# Patient Record
Sex: MALE | Race: BLACK OR AFRICAN AMERICAN | Employment: OTHER | URBAN - METROPOLITAN AREA
[De-identification: names, ages, dates, MRNs, and addresses within clinical notes are randomized per-mention and may not be internally consistent; named-entity substitution may affect disease eponyms.]

---

## 2017-01-06 ENCOUNTER — PATIENT OUTREACH (OUTPATIENT)
Dept: FAMILY MEDICINE CLINIC | Age: 52
End: 2017-01-06

## 2017-01-06 NOTE — PROGRESS NOTES
Transitions of Care Coordination    Reached patient and identified self/role. Mr. Blanc Cons stated \"I'm doing fine. I can't think of anything I need. \"  No other information provided. Will continue to follow. No upcoming appointment with PCP is noted.

## 2017-01-24 ENCOUNTER — PATIENT OUTREACH (OUTPATIENT)
Dept: FAMILY MEDICINE CLINIC | Age: 52
End: 2017-01-24

## 2017-01-24 NOTE — PROGRESS NOTES
Transitions of Care Coordination    Henryparviz Henderson was hospitalized at [facility] 12/19-12/22/16 for CP and discharged to home. NST done in the hospital was negative for ischemia. The patient attends dialysis on a T/TH/SAT schedule. An appointment with Dr. Nunu Herrera is noted for tomorrow, 1/25/17. Goals met. Episode resolved:  No hospitalization or ED visit noted 30 days from discharge on 12/22/16.

## 2017-01-25 ENCOUNTER — OFFICE VISIT (OUTPATIENT)
Dept: FAMILY MEDICINE CLINIC | Age: 52
End: 2017-01-25

## 2017-01-25 VITALS
RESPIRATION RATE: 18 BRPM | SYSTOLIC BLOOD PRESSURE: 170 MMHG | HEIGHT: 68 IN | WEIGHT: 258.6 LBS | TEMPERATURE: 97.8 F | OXYGEN SATURATION: 100 % | HEART RATE: 78 BPM | DIASTOLIC BLOOD PRESSURE: 78 MMHG | BODY MASS INDEX: 39.19 KG/M2

## 2017-01-25 DIAGNOSIS — G89.29 CHRONIC LEFT SHOULDER PAIN: Primary | ICD-10-CM

## 2017-01-25 DIAGNOSIS — M25.512 CHRONIC LEFT SHOULDER PAIN: Primary | ICD-10-CM

## 2017-01-25 NOTE — PROGRESS NOTES
Patient is here for left shoulder pain, he states it got a little better after the angioplasty but it still hurts. 1. Have you been to the ER, urgent care clinic since your last visit? Hospitalized since your last visit?no    2. Have you seen or consulted any other health care providers outside of the [facility] since your last visit? Include any pap smears or colon screening.  no

## 2017-01-25 NOTE — PROGRESS NOTES
HISTORY OF PRESENT ILLNESS  Melissa Cary is a 54 y.o. male. HPI Comments: Patient is here because his left shoulder has been causing him to have some pain and he can barely lift his arm up. Patient says that last week he had a angioplasty on his av fistula as patient receives dialysis. I will order a x-ray of the shoulder. Shoulder Pain    The history is provided by the patient. The incident occurred more than 1 week ago. There was no injury mechanism. The left shoulder is affected. The pain is at a severity of 2/10. The pain is mild. The pain has been constant since onset. The pain radiates. There is no history of shoulder injury. He has no other injuries. There is no history of shoulder surgery. Pertinent negatives include no numbness and no tingling. He reports no foreign bodies present. Review of Systems   Constitutional: Negative for chills, fever and malaise/fatigue. HENT: Negative for congestion, ear pain, nosebleeds and sore throat. Eyes: Negative for blurred vision, double vision and pain. Respiratory: Negative for cough, sputum production, shortness of breath and wheezing. Cardiovascular: Negative for chest pain, palpitations, claudication and leg swelling. Gastrointestinal: Negative for abdominal pain, diarrhea, nausea and vomiting. Genitourinary: Negative for dysuria and urgency. Musculoskeletal: Positive for joint pain. Negative for back pain and neck pain. Left shoulder pain    Neurological: Negative for dizziness, tingling, focal weakness, weakness, numbness and headaches. Psychiatric/Behavioral: The patient is not nervous/anxious. Physical Exam   Constitutional: He is oriented to person, place, and time. He appears well-developed and well-nourished. No distress. HENT:   Head: Normocephalic and atraumatic.    Right Ear: External ear normal.   Left Ear: External ear normal.   Mouth/Throat: Oropharynx is clear and moist.   Eyes: EOM are normal. Pupils are equal, round, and reactive to light. No scleral icterus. Neck: Normal range of motion. No thyromegaly present. Cardiovascular: Normal rate, regular rhythm and normal heart sounds. Pulmonary/Chest: Effort normal and breath sounds normal. No respiratory distress. He has no wheezes. Abdominal: Soft. Bowel sounds are normal. He exhibits no distension. There is no tenderness. Musculoskeletal: He exhibits tenderness. Left upper arm: He exhibits tenderness and bony tenderness. Lymphadenopathy:     He has no cervical adenopathy. Neurological: He is alert and oriented to person, place, and time. Psychiatric: He has a normal mood and affect. ASSESSMENT and PLAN  Left shoulder pain :  - X-ray of the left shoulder   - Rice Regimen discussed.

## 2017-01-25 NOTE — MR AVS SNAPSHOT
Visit Information Date & Time Provider Department Dept. Phone Encounter #  
 1/25/2017 12:45 PM Amy Masterson MD [address] [facility] [phone] [id number] Follow-up Instructions Return in about 3 months (around 4/25/2017) for htn . Your Appointments 4/28/2017  2:00 PM  
Follow Up with Amy Masterson MD  
[address]13 [facility] ([address]) Appt Note: Return in about 3 months (around 4/25/2017) for htn . 305 [facility] [address] [address]  
[phone]  
  
   
 305 [facility] [address] Upcoming Health Maintenance Date Due Pneumococcal 19-64 Highest Risk (1 of 3 - PCV13) 8/29/1986 DTaP/Tdap/Td series (1 - Tdap) 8/29/1988 EYE EXAM RETINAL OR DILATED Q1 2/25/2015 INFLUENZA AGE 9 TO ADULT 8/1/2016 FOOT EXAM Q1 4/22/2017 HEMOGLOBIN A1C Q6M 6/20/2017 MICROALBUMIN Q1 9/14/2017 LIPID PANEL Q1 9/14/2017 Allergies as of 1/25/2017  Review Complete On: 1/25/2017 By: Amy Masterson MD  
 No Known Allergies Current Immunizations  Never Reviewed No immunizations on file. Not reviewed this visit You Were Diagnosed With   
  
 Codes Comments Chronic left shoulder pain    -  Primary ICD-10-CM: M25.512, H32.39 ICD-9-CM: 719.41, 338.29 Vitals BP Pulse Temp Resp Height(growth percentile) Weight(growth percentile) 170/78 (BP 1 Location: Left arm, BP Patient Position: Sitting) 78 97.8 °F (36.6 °C) (Oral) 18 5' 8\" (1.727 m) 258 lb 9.6 oz (117.3 kg) SpO2 BMI Smoking Status 100% 39.32 kg/m2 Current Some Day Smoker Vitals History BMI and BSA Data Body Mass Index Body Surface Area  
 39.32 kg/m 2 2.37 m 2 Preferred Pharmacy Pharmacy Name Phone [facility] [phone] [facility] [address] [facility] [phone] Your Updated Medication List  
  
   
 This list is accurate as of: 1/25/17  1:10 PM.  Always use your most recent med list. amLODIPine 10 mg tablet Commonly known as:  Maida Jose Take  by mouth daily. ammonium lactate 12 % topical cream  
Commonly known as:  LAC-HYDRIN Apply  to affected area two (2) times a day. rub in to affected area well  
  
 calcium acetate 667 mg Cap Commonly known as:  PHOSLO Take 1 Cap by mouth three (3) times daily (with meals). carvedilol 25 mg tablet Commonly known as:  Juline Balzarine Take 1 Tab by mouth daily. hydrALAZINE 100 mg tablet Commonly known as:  APRESOLINE Take 1 Tab by mouth daily. insulin NPH/insulin regular 100 unit/mL (70-30) injection Commonly known as:  HumuLIN 70/30 Inject 30 units sc daily PROVENTIL HFA 90 mcg/actuation inhaler Generic drug:  albuterol Take  by inhalation. RENAL SOFTGELS PO Take  by mouth. simvastatin 10 mg tablet Commonly known as:  ZOCOR Take  by mouth nightly. Follow-up Instructions Return in about 3 months (around 4/25/2017) for htn . To-Do List   
 01/25/2017 Imaging:  XR SHOULDER LT AP/LAT MIN 2 V Introducing [facility] & HEALTH SERVICES! [address] introduces PhoneTell patient portal. Now you can access parts of your medical record, email your doctor's office, and request medication refills online. 1. In your internet browser, go to [email or web address] 2. Click on the First Time User? Click Here link in the Sign In box. You will see the New Member Sign Up page. 3. Enter your PhoneTell Access Code exactly as it appears below. You will not need to use this code after youve completed the sign-up process. If you do not sign up before the expiration date, you must request a new code. · PhoneTell Access Code: [email or web address] Expires: 3/22/2017  3:05 PM 
 
4.  Enter the last four digits of your Social Security Number (xxxx) and Date of Birth (mm/dd/yyyy) as indicated and click Submit. You will be taken to the next sign-up page. 5. Create a Topmission ID. This will be your Topmission login ID and cannot be changed, so think of one that is secure and easy to remember. 6. Create a Topmission password. You can change your password at any time. 7. Enter your Password Reset Question and Answer. This can be used at a later time if you forget your password. 8. Enter your e-mail address. You will receive e-mail notification when new information is available in [address] 9. Click Sign Up. You can now view and download portions of your medical record. 10. Click the Download Summary menu link to download a portable copy of your medical information. If you have questions, please visit the Frequently Asked Questions section of the Topmission website. Remember, Topmission is NOT to be used for urgent needs. For medical emergencies, dial 911. Now available from your iPhone and Android! Please provide this summary of care documentation to your next provider. Your primary care clinician is listed as Karla Villavicencio. If you have any questions after today's visit, please call [phone].

## 2017-01-26 ENCOUNTER — TELEPHONE (OUTPATIENT)
Dept: FAMILY MEDICINE CLINIC | Age: 52
End: 2017-01-26

## 2017-02-02 NOTE — TELEPHONE ENCOUNTER
Pt states received xray results and were normal for shoulder. Pt states still having trouble with shoulder. States having trouble lifting arm up high and painful.

## 2017-02-02 NOTE — TELEPHONE ENCOUNTER
Spoke with patient and patient states he still experiences pain event though xray is normal, patient would like something to take for pain that is not an opioid.

## 2017-02-06 ENCOUNTER — TELEPHONE (OUTPATIENT)
Dept: FAMILY MEDICINE CLINIC | Age: 52
End: 2017-02-06

## 2017-02-06 DIAGNOSIS — G89.29 CHRONIC LEFT SHOULDER PAIN: Primary | ICD-10-CM

## 2017-02-06 DIAGNOSIS — M25.512 CHRONIC LEFT SHOULDER PAIN: Primary | ICD-10-CM

## 2017-02-06 NOTE — TELEPHONE ENCOUNTER
Informed patient provider will rx cream, give 24 hrs before he contacted. Patient verbally understood.

## 2017-02-06 NOTE — TELEPHONE ENCOUNTER
Patient has bad kidney  function so I will not be able to prescribe anything that he can take orally. I will prescribe a compound cream with a medicap form that can be faxed.

## 2017-03-10 ENCOUNTER — OFFICE VISIT (OUTPATIENT)
Dept: FAMILY MEDICINE CLINIC | Age: 52
End: 2017-03-10

## 2017-03-10 VITALS
RESPIRATION RATE: 20 BRPM | WEIGHT: 258 LBS | BODY MASS INDEX: 39.1 KG/M2 | TEMPERATURE: 98.7 F | HEART RATE: 78 BPM | HEIGHT: 68 IN | OXYGEN SATURATION: 95 % | DIASTOLIC BLOOD PRESSURE: 73 MMHG | SYSTOLIC BLOOD PRESSURE: 178 MMHG

## 2017-03-10 DIAGNOSIS — N52.9 ERECTILE DYSFUNCTION, UNSPECIFIED ERECTILE DYSFUNCTION TYPE: Primary | ICD-10-CM

## 2017-03-10 DIAGNOSIS — I10 ESSENTIAL HYPERTENSION: ICD-10-CM

## 2017-03-10 NOTE — PROGRESS NOTES
Chief Complaint   Patient presents with    Request For New Medication     Pt would like medication for ED. 1. Have you been to the ER, urgent care clinic since your last visit? Hospitalized since your last visit? No    2. Have you seen or consulted any other health care providers outside of the [address] [place] [address] since your last visit? Include any pap smears or colon screening.  No

## 2017-03-10 NOTE — PROGRESS NOTES
HISTORY OF PRESENT ILLNESS  Merle Alvarado is a 54 y.o. male. HPI Comments:  Patient mentions he has recently been in a relationship and he was not able to get a erection. He is enquiring about medications to help with his erectile dysfunction and I have advised him first to have a EKG. Patient  Patients kidney specialist number is [phone], Dr Cornelia Bone  And I have also advised this patient that I will need to speak to his specialist first.    I have advised patient that his ekg shows some strain left side of heart. He mentions that he is on a list for kidney transplant and he will be having some cardiac testing due to being on this list. I have advised him that he will need to have an echo done. I have also advised him that he is not a good candidate for medications for erectile dysfunction. Request For New Medication   The history is provided by the patient. This is a new problem. The problem occurs constantly. The problem has not changed since onset. Pertinent negatives include no chest pain, no abdominal pain, no headaches and no shortness of breath. Nothing aggravates the symptoms. Nothing relieves the symptoms. He has tried nothing for the symptoms. Erectile Dysfunction   The history is provided by the patient. This is a chronic problem. The problem occurs constantly. The problem has been gradually worsening. Pertinent negatives include no chest pain, no abdominal pain, no headaches and no shortness of breath. The symptoms are aggravated by stress (ckd). Nothing relieves the symptoms. He has tried nothing for the symptoms. Review of Systems   Constitutional: Positive for malaise/fatigue. Negative for chills and fever. HENT: Negative for congestion, hearing loss and sore throat. Eyes: Negative for blurred vision, double vision, pain and discharge. Respiratory: Negative for cough, sputum production, shortness of breath and wheezing.     Cardiovascular: Negative for chest pain, palpitations, orthopnea and leg swelling. Gastrointestinal: Negative for abdominal pain, constipation, diarrhea, nausea and vomiting. Genitourinary: Negative for dysuria, hematuria and urgency. Musculoskeletal: Negative for back pain, falls, joint pain and myalgias. Neurological: Negative for dizziness, tingling, tremors, focal weakness, seizures, weakness and headaches. Psychiatric/Behavioral: Positive for depression. Negative for substance abuse and suicidal ideas. The patient is nervous/anxious. The patient does not have insomnia. Visit Vitals    /73    Pulse 78    Temp 98.7 °F (37.1 °C)    Resp 20    Ht 5' 8\" (1.727 m)    Wt 258 lb (117 kg)    SpO2 95%    BMI 39.23 kg/m2       Physical Exam   Constitutional: He is oriented to person, place, and time. He appears well-developed and well-nourished. No distress. HENT:   Head: Normocephalic and atraumatic. Right Ear: External ear normal.   Left Ear: External ear normal.   Mouth/Throat: Oropharynx is clear and moist.   Eyes: EOM are normal. Pupils are equal, round, and reactive to light. No scleral icterus. Neck: Normal range of motion. No thyromegaly present. Cardiovascular: Normal rate, regular rhythm and normal heart sounds. Pulmonary/Chest: Effort normal and breath sounds normal. No respiratory distress. He has no wheezes. Abdominal: Soft. Bowel sounds are normal. He exhibits no distension. There is no tenderness. Lymphadenopathy:     He has no cervical adenopathy. Neurological: He is alert and oriented to person, place, and time. Psychiatric: He has a normal mood and affect. ASSESSMENT and PLAN  Erectile dysfunction :  - You will need further cardiac testing.

## 2017-03-15 ENCOUNTER — TELEPHONE (OUTPATIENT)
Dept: FAMILY MEDICINE CLINIC | Age: 52
End: 2017-03-15

## 2017-03-15 NOTE — TELEPHONE ENCOUNTER
Patient is calling wondering if Dr. Antonio Solomon has reviewed the heart cath report (it is scanned in), and if you will give him the medication he discussed with you at last visit now? Please let patient know.  Patient did state he has a follow-up cardio appointment this Friday for the results of that test.

## 2017-03-21 NOTE — TELEPHONE ENCOUNTER
I spoke to patient and informed him once dr Yasmin Carmichael reviews his results of the cardiac cath, will inform patient. He stated ok.

## 2017-03-21 NOTE — TELEPHONE ENCOUNTER
I have reviewed his recent records and I will not be able to prescribe him the medication we discussed.  I do not feel it is safe for him to use and he can also discuss this with his kidney specialist.

## 2017-03-21 NOTE — TELEPHONE ENCOUNTER
Spoke with patient and informed him of Garo Stevenson decision and remarks, patient verbally understood.

## 2017-05-03 ENCOUNTER — OFFICE VISIT (OUTPATIENT)
Dept: FAMILY MEDICINE CLINIC | Age: 52
End: 2017-05-03

## 2017-05-03 VITALS
HEART RATE: 69 BPM | HEIGHT: 68 IN | SYSTOLIC BLOOD PRESSURE: 143 MMHG | OXYGEN SATURATION: 99 % | WEIGHT: 261 LBS | DIASTOLIC BLOOD PRESSURE: 82 MMHG | BODY MASS INDEX: 39.56 KG/M2 | TEMPERATURE: 96.4 F | RESPIRATION RATE: 18 BRPM

## 2017-05-03 DIAGNOSIS — E78.5 HYPERLIPIDEMIA, UNSPECIFIED HYPERLIPIDEMIA TYPE: ICD-10-CM

## 2017-05-03 DIAGNOSIS — I10 ESSENTIAL HYPERTENSION: ICD-10-CM

## 2017-05-03 DIAGNOSIS — Z00.00 MEDICARE ANNUAL WELLNESS VISIT, SUBSEQUENT: Primary | ICD-10-CM

## 2017-05-03 DIAGNOSIS — E11.9 TYPE 2 DIABETES MELLITUS WITHOUT COMPLICATION, WITHOUT LONG-TERM CURRENT USE OF INSULIN (HCC): ICD-10-CM

## 2017-05-03 DIAGNOSIS — Z13.39 SCREENING FOR ALCOHOLISM: ICD-10-CM

## 2017-05-03 DIAGNOSIS — Z00.00 ROUTINE GENERAL MEDICAL EXAMINATION AT A HEALTH CARE FACILITY: ICD-10-CM

## 2017-05-03 DIAGNOSIS — Z99.2 CKD (CHRONIC KIDNEY DISEASE) REQUIRING CHRONIC DIALYSIS (HCC): ICD-10-CM

## 2017-05-03 DIAGNOSIS — N18.6 CKD (CHRONIC KIDNEY DISEASE) REQUIRING CHRONIC DIALYSIS (HCC): ICD-10-CM

## 2017-05-03 NOTE — MR AVS SNAPSHOT
Visit Information Date & Time Provider Department Dept. Phone Encounter #  
 5/3/2017 10:15 AM Jesusa Mcardle, MD [address] [facility] [address] [address] [phone] [id number] Your Appointments 5/3/2017 10:15 AM  
Office Visit with Jesusa Mcardle, MD  
[address]) Appt Note: [address] , Return in about 3 months (around 4/25/2017) for htn .; r/s from 04/28/17  
 305 [facility] [address] [address]  
[phone]  
  
   
 305 [facility] [address] Upcoming Health Maintenance Date Due Pneumococcal 19-64 Highest Risk (1 of 3 - PCV13) 8/29/1986 DTaP/Tdap/Td series (1 - Tdap) 8/29/1988 EYE EXAM RETINAL OR DILATED Q1 2/25/2015 FOOT EXAM Q1 4/22/2017 HEMOGLOBIN A1C Q6M 6/20/2017 INFLUENZA AGE 9 TO ADULT 8/1/2017 MICROALBUMIN Q1 9/14/2017 LIPID PANEL Q1 9/14/2017 Allergies as of 5/3/2017  Review Complete On: 5/3/2017 By: Charles Arenas LPN No Known Allergies Current Immunizations  Never Reviewed No immunizations on file. Not reviewed this visit You Were Diagnosed With   
  
 Codes Comments Medicare annual wellness visit, subsequent    -  Primary ICD-10-CM: Z00.00 ICD-9-CM: V70.0 Hyperlipidemia, unspecified hyperlipidemia type     ICD-10-CM: E78.5 ICD-9-CM: 272.4 Essential hypertension     ICD-10-CM: I10 
ICD-9-CM: 401.9 CKD (chronic kidney disease) requiring chronic dialysis (HCC)     ICD-10-CM: N18.6, Z99.2 ICD-9-CM: 585.6, V45.11 Type 2 diabetes mellitus without complication, without long-term current use of insulin (HCC)     ICD-10-CM: E11.9 ICD-9-CM: 250.00 Vitals BP Pulse Temp Resp Height(growth percentile) Weight(growth percentile) 143/82 (BP 1 Location: Right arm, BP Patient Position: Sitting) 69 96.4 °F (35.8 °C) (Oral) 18 5' 8\" (1.727 m) 261 lb (118.4 kg) SpO2 BMI Smoking Status 99% 39.68 kg/m2 Current Some Day Smoker Vitals History BMI and BSA Data Body Mass Index Body Surface Area  
 39.68 kg/m 2 2.38 m 2 Preferred Pharmacy Pharmacy Name Phone [facility] [phone] [facility] Yuliya Vaughan [phone] Your Updated Medication List  
  
   
This list is accurate as of: 5/3/17  9:59 AM.  Always use your most recent med list. amLODIPine 10 mg tablet Commonly known as:  [place] Take  by mouth daily. ammonium lactate 12 % topical cream  
Commonly known as:  LAC-HYDRIN Apply  to affected area two (2) times a day. rub in to affected area well  
  
 calcium acetate 667 mg Cap Commonly known as:  PHOSLO Take 1 Cap by mouth three (3) times daily (with meals). carvedilol 25 mg tablet Commonly known as:  Johnnette  Take 1 Tab by mouth daily. hydrALAZINE 100 mg tablet Commonly known as:  APRESOLINE Take 1 Tab by mouth daily. insulin NPH/insulin regular 100 unit/mL (70-30) injection Commonly known as:  HumuLIN 70/30 Inject 30 units sc daily PROVENTIL HFA 90 mcg/actuation inhaler Generic drug:  albuterol Take  by inhalation. RENAL SOFTGELS PO Take  by mouth. simvastatin 10 mg tablet Commonly known as:  ZOCOR Take  by mouth nightly. To-Do List   
 05/03/2017 Lab:  CBC WITH AUTOMATED DIFF   
  
 05/03/2017 Lab:  HEMOGLOBIN A1C WITH EAG   
  
 05/03/2017 Lab:  LIPID PANEL   
  
 05/03/2017 Lab:  METABOLIC PANEL, COMPREHENSIVE   
  
 05/03/2017 Lab:  MICROALBUMIN, UR, RAND W/ MICROALBUMIN/CREA RATIO Introducing [facility] HEALTH SERVICES! [facility] introduces MIKESTAR patient portal. Now you can access parts of your medical record, email your doctor's office, and request medication refills online. 1. In your internet browser, go to [email or web address] 2. Click on the First Time User? Click Here link in the Sign In box. You will see the New Member Sign Up page. 3. Enter your Obsorb Access Code exactly as it appears below. You will not need to use this code after youve completed the sign-up process. If you do not sign up before the expiration date, you must request a new code. · Obsorb Access Code: [email or web address]  Expires: 8/1/2017  9:59 AM 
 
4. Enter the last four digits of your Social Security Number (xxxx) and Date of Birth (mm/dd/yyyy) as indicated and click Submit. You will be taken to the next sign-up page. 5. Create a Obsorb ID. This will be your Obsorb login ID and cannot be changed, so think of one that is secure and easy to remember. 6. Create a Obsorb password. You can change your password at any time. 7. Enter your Password Reset Question and Answer. This can be used at a later time if you forget your password. 8. Enter your e-mail address. You will receive e-mail notification when new information is available in [address] 9. Click Sign Up. You can now view and download portions of your medical record. 10. Click the Download Summary menu link to download a portable copy of your medical information. If you have questions, please visit the Frequently Asked Questions section of the Obsorb website. Remember, Obsorb is NOT to be used for urgent needs. For medical emergencies, dial 911. Now available from your iPhone and Android! Please provide this summary of care documentation to your next provider. Your primary care clinician is listed as Karla Chairez. If you have any questions after today's visit, please call [phone].

## 2017-05-03 NOTE — PROGRESS NOTES
HISTORY OF PRESENT ILLNESS  Paul Bowers is a 54 y.o. male. HPI Comments: Patient is here for medicare annual visit. He does see an eye doctor and dentist. He is due for an eye exam and will make an appointment. Patient does see a podiatrist and  Has diabetic foot exams there, he has an apt next week. I will order some blood work for his chronic medical conditions. Patient has been taking his insulin 30 units in am and 30 in pm. Patient is still on dialysis and he has been taking his medications. Annual Exam   The history is provided by the patient. This is a new problem. The problem occurs constantly. The problem has not changed since onset. Associated symptoms include shortness of breath. Pertinent negatives include no chest pain, no abdominal pain and no headaches. Nothing aggravates the symptoms. Nothing relieves the symptoms. He has tried nothing for the symptoms. Diabetes   The history is provided by the patient. This is a chronic problem. The problem occurs constantly. The problem has been gradually worsening. Associated symptoms include shortness of breath. Pertinent negatives include no chest pain, no abdominal pain and no headaches. The symptoms are aggravated by stress. The symptoms are relieved by medications. Treatments tried: currently on insulin. The treatment provided mild relief. Hypertension    The history is provided by the patient. This is a chronic problem. The problem has not changed since onset. Associated symptoms include shortness of breath. Pertinent negatives include no chest pain, no orthopnea, no confusion, no malaise/fatigue, no blurred vision, no headaches, no tinnitus, no neck pain, no peripheral edema, no dizziness, no nausea and no vomiting. Risk factors include male gender and hypertension. Cholesterol Problem   The history is provided by the patient. This is a chronic problem. The problem has been gradually worsening.  Associated symptoms include shortness of breath. Pertinent negatives include no chest pain, no abdominal pain and no headaches. The symptoms are aggravated by stress and eating. Treatments tried: diet. The treatment provided mild relief. Review of Systems   Constitutional: Negative for chills, fever and malaise/fatigue. HENT: Positive for ear pain. Negative for congestion, ear discharge, sore throat and tinnitus. Eyes: Negative for blurred vision, double vision, pain and discharge. Respiratory: Positive for shortness of breath. Negative for cough, sputum production and wheezing. Cardiovascular: Negative for chest pain, orthopnea and leg swelling. Gastrointestinal: Negative for abdominal pain, constipation, nausea and vomiting. Genitourinary: Negative for dysuria, frequency, hematuria and urgency. Musculoskeletal: Negative for joint pain and neck pain. Neurological: Negative for dizziness, tingling, focal weakness, weakness and headaches. Psychiatric/Behavioral: Negative for confusion and suicidal ideas. The patient is not nervous/anxious. Visit Vitals    /82 (BP 1 Location: Right arm, BP Patient Position: Sitting)    Pulse 69    Temp 96.4 °F (35.8 °C) (Oral)    Resp 18    Ht 5' 8\" (1.727 m)    Wt 261 lb (118.4 kg)    SpO2 99%    BMI 39.68 kg/m2       Physical Exam   Constitutional: He is oriented to person, place, and time. He appears well-developed and well-nourished. No distress. HENT:   Head: Normocephalic and atraumatic. Right Ear: External ear normal.   Left Ear: External ear normal.   Mouth/Throat: Oropharynx is clear and moist.   Eyes: EOM are normal. Pupils are equal, round, and reactive to light. No scleral icterus. Neck: Normal range of motion. No thyromegaly present. Cardiovascular: Normal rate, regular rhythm and normal heart sounds. Pulmonary/Chest: Effort normal and breath sounds normal. No respiratory distress. He has no wheezes. Abdominal: Soft.  Bowel sounds are normal. He exhibits no distension. There is no tenderness. Lymphadenopathy:     He has no cervical adenopathy. Neurological: He is alert and oriented to person, place, and time. Psychiatric: He has a normal mood and affect. This is a Subsequent Medicare Annual Wellness Visit providing Personalized Prevention Plan Services (PPPS) (Performed 12 months after initial AWV and PPPS )    I have reviewed the patient's medical history in detail and updated the computerized patient record. History     Past Medical History:   Diagnosis Date    Diabetes [facility]     Dialysis patient ([facility]     HTN (hypertension)     Kidney failure       Past Surgical History:   Procedure Laterality Date    COLONOSCOPY N/A 11/18/2016    COLONOSCOPY, SURVEILLANCE w/ polypectomy w/ endo clipping x2 performed by Manda Tidwell MD at [address] [place] [address] HX OTHER SURGICAL      fistula, left arm     Current Outpatient Prescriptions   Medication Sig Dispense Refill    insulin NPH/insulin regular (HUMULIN 70/30) 100 unit/mL (70-30) injection Inject 30 units sc daily 1 Vial 5    carvedilol (COREG) 25 mg tablet Take 1 Tab by mouth daily. 30 Tab 3    hydrALAZINE (APRESOLINE) 100 mg tablet Take 1 Tab by mouth daily. 90 Tab 0    calcium acetate (PHOSLO) 667 mg cap Take 1 Cap by mouth three (3) times daily (with meals). 90 Cap 0    ammonium lactate (LAC-HYDRIN) 12 % topical cream Apply  to affected area two (2) times a day. rub in to affected area well 280 g 0    amLODIPine (NORVASC) 10 mg tablet Take  by mouth daily.  B COMPLEX & C NO.20/FOLIC ACID (RENAL SOFTGELS PO) Take  by mouth.  simvastatin (ZOCOR) 10 mg tablet Take  by mouth nightly.  albuterol (PROVENTIL HFA) 90 mcg/actuation inhaler Take  by inhalation.        No Known Allergies  Family History   Problem Relation Age of Onset    Stroke Mother     Cancer Father      colon cancer     Social History   Substance Use Topics    Smoking status: Current Some Day Smoker    Smokeless tobacco: Never Used    Alcohol use Yes      Comment: occa     Patient Active Problem List   Diagnosis Code    Type 2 diabetes mellitus without complication ([facility]) H72.7    Hyperlipidemia E78.5    Essential hypertension I10    CKD (chronic kidney disease) requiring chronic dialysis ([facility]) N18.6, R07.2    Diastolic CHF, chronic ([facility]) I50.32    Advance care planning Z71.89    Tubular adenoma of colon D12.6    Chest pain R07.9       Depression Risk Factor Screening:     PHQ over the last two weeks 5/3/2017   Little interest or pleasure in doing things Not at all   Feeling down, depressed or hopeless Not at all   Total Score PHQ 2 0     Alcohol Risk Factor Screening:   Patient does not drink alcohol    Functional Ability and Level of Safety:   Denies difficulty walking or getting around. The patient does not use an assistive device. The patient has no difficulty sitting down or standing from a seated position. The patient denies any difficulty getting out of bed and denies any difficulty getting out of bath or shower. The patient has no trouble toileting and is experiencing no incontinence. The patient uses no incontinence products. Patient has no difficulty preparing balanced nutritious meals , groms and is able to dress and undress without any difficulty. The patient has no difficulty taking prescribed medications properly and has no difficulty with personal hygiene such as brushing teeth , bathing , or showering. Hearing Loss   Good hearing on whisper testing , 2 feel behind the patient. Activities of Daily Living   Denies any difficulty with any indoor household activities and denies experiencing any short term memory loss. Fall Risk   Patient has not had a fall  Abuse Screen   Patient is not abused    Review of Systems   See above    Physical Examination   See above  Evaluation of Cognitive Function:  The patient has not lost interest in activities. Has no feelings of hopelessness.  Sleep hygiene is good and has good daily energy. Appetite is also appropriate. Does not feel bad about herself, has no trouble concentrating. No suicidal ideation. Patient Care Team:  Suleiman Mendoza MD as PCP - General (Internal Medicine)    Advice/Referrals/Counseling   Education and counseling provided:  End-of-Life planning (with patient's consent)  Pneumococcal Vaccine  Influenza Vaccine  Hepatitis B Vaccine  Prostate cancer screening tests (PSA, covered annually)  Colorectal cancer screening tests  Cardiovascular screening blood test  Bone mass measurement (DEXA)  Screening for glaucoma  Diabetes screening test      Assessment/Plan   Medicare wellness visit , subsequent:  1) Please make sure you have a routine physical exam every 1-2 years. 2) Annual check up with eye doctor and dentist.  3) Colorectal cancer screening with colonoscopy every ten years at age 48. Depending on certain risk factors screening may need to be done earlier. 4) Rectal exam and PSA screening at age of 50, screening may be done earlier depending on certain risk factors as discussed.    ) Bone density testing starting at the age of 72.   ) Routine blood work to be ordered as part of physical exam and has been discussed with patient.  ) Screening for STD's/HIV.  ) Exercise at least 30 min 3-5 times a week for goal BMI of less than or equal to 25.    Please make sure you wear a seat belt while driving daily , helmet safety discussed.  ) Please avoid smoking , alcohol and illicit drug use.  ) Daily requirement of calcium is 1200 mg per day and 1000 IU of vitamin D.  ) Please make sure all immunizations are up to date:       - Influenza vaccine every year        - Tdap every 10 years       - Pneumococcal vaccine starting at age of 72       - Shingles at age 63     Diabetes :  1)   Goal HBA1C < 7.  2)   Post prandial blood sugar less than or equal to 180.  3)   Exercise 30 min 3-5 times a week for goal BMI of 25.  4)   Please monitor blood sugars as directed and keep a log to bring in with you at each visit. 5)   Diabetic diet discussed and patient instructions to be handed out. 6)   Goal LDL< 100  7)   Goal BP< 120/80 mmhg. 8)   Annual eye exam and foot exam.  9)   Please examine you feet daily for any skin breakages or ulcers. 10) Referral made to see nutritionist for better dietary guidance. 11) Continue current medications as prescribed. 12) Explained the side effects of medication and patient verbalized understanding. 13) Please avoid smoking , alcohol and illicit drug use if applicable to you.  14) Please have blood work ordered today completed. 15) Please make sure you schedule your routine medical exam every 1-2 years. Hypertension :  1) Goal blood pressure less than equal to 140/90 mmHg, goal BP can vary depending on risk factors as discussed. 2) Lifestyle modifications discussed with patient, low sodium <2 gm, low salt , DASH diet  3) Exercise for at least 30 min 3-5 times a week for goal BMI of less than or equal  To 25.  4) Continue current medications as prescribed. 5) Please begin medication as discussed for better blood pressure control, explained side effects and patient verbalized understanding. 6) Goal LDL<100.  7) Please monitor your blood pressure and keep a log to bring in with you at each visit. 8) Discussed risk factors with patient such as CAD, FAST of stroke symptoms, pt verbalizes understanding. 9) Please avoid smoking , alcohol and any illicit drug use if applicable to you. Hyperlipidemia :  1) Goal LDL less than or equal to 100 mg/dl , total cholesterol less than or equal to 200 mg/dl. 2) Goal blood pressure less than or equal to 140/90 mmHg. 3) Discussed low - cholesterol and low fat diet, Good Fats vs Bad Fats. 4) Exercise 30 min 3-5 times a week for goal BMI of less than or equal to 25.  5) Continue current medication as prescribed for cholesterol control.   6) Explained the side effects of medication and patient verbalized understanding. 7) You may also begin fish oil 1000 mg 3-4 times a day for better cholesterol control. 8) You may also try red yeast rice for cholesterol control. 9) Please drink skim milk if you drink dairy products. 10) Please avoid smoking , alcohol , or any illicit drug use if applicable to you. 11) Regular cholesterol panel to be done every 6-12 months.  12) Discussed attributing risk factors , patient verbalized understanding.

## 2017-05-03 NOTE — PROGRESS NOTES
Joan Conley is a 54 y.o. male presents to office for annual wellness. 1. Have you been to the ER, urgent care clinic or hospitalized since your last visit? no  2.  Have you seen any other providers outside of [facility] since your last visit? no        Health Maintenance items with a due date reviewed with patient:  Health Maintenance Due   Topic Date Due    Pneumococcal 19-64 Highest Risk (1 of 3 - PCV13) 08/29/1986    DTaP/Tdap/Td series (1 - Tdap) 08/29/1988    EYE EXAM RETINAL OR DILATED Q1  02/25/2015    FOOT EXAM Q1  04/22/2017

## 2017-05-04 LAB
ALBUMIN SERPL-MCNC: 4.6 G/DL (ref 3.5–5.5)
ALBUMIN/CREAT UR: 272.8 MG/G CREAT (ref 0–30)
ALBUMIN/GLOB SERPL: 1.6 {RATIO} (ref 1.2–2.2)
ALP SERPL-CCNC: 71 IU/L (ref 39–117)
ALT SERPL-CCNC: 16 IU/L (ref 0–44)
AST SERPL-CCNC: 16 IU/L (ref 0–40)
BASOPHILS # BLD AUTO: 0 X10E3/UL (ref 0–0.2)
BASOPHILS NFR BLD AUTO: 1 %
BILIRUB SERPL-MCNC: 0.4 MG/DL (ref 0–1.2)
BUN SERPL-MCNC: 37 MG/DL (ref 6–24)
BUN/CREAT SERPL: 4 (ref 9–20)
CALCIUM SERPL-MCNC: 9.1 MG/DL (ref 8.7–10.2)
CHLORIDE SERPL-SCNC: 89 MMOL/L (ref 96–106)
CHOLEST SERPL-MCNC: 130 MG/DL (ref 100–199)
CO2 SERPL-SCNC: 30 MMOL/L (ref 18–29)
CREAT SERPL-MCNC: 8.52 MG/DL (ref 0.76–1.27)
CREAT UR-MCNC: 221.6 MG/DL
EOSINOPHIL # BLD AUTO: 0.1 X10E3/UL (ref 0–0.4)
EOSINOPHIL NFR BLD AUTO: 2 %
ERYTHROCYTE [DISTWIDTH] IN BLOOD BY AUTOMATED COUNT: 14.1 % (ref 12.3–15.4)
EST. AVERAGE GLUCOSE BLD GHB EST-MCNC: 151 MG/DL
GLOBULIN SER CALC-MCNC: 2.9 G/DL (ref 1.5–4.5)
GLUCOSE SERPL-MCNC: 123 MG/DL (ref 65–99)
HBA1C MFR BLD: 6.9 % (ref 4.8–5.6)
HCT VFR BLD AUTO: 38.3 % (ref 37.5–51)
HDLC SERPL-MCNC: 33 MG/DL
HGB BLD-MCNC: 12.7 G/DL (ref 12.6–17.7)
IMM GRANULOCYTES # BLD: 0 X10E3/UL (ref 0–0.1)
IMM GRANULOCYTES NFR BLD: 0 %
LDLC SERPL CALC-MCNC: 75 MG/DL (ref 0–99)
LYMPHOCYTES # BLD AUTO: 1.9 X10E3/UL (ref 0.7–3.1)
LYMPHOCYTES NFR BLD AUTO: 27 %
MCH RBC QN AUTO: 31.4 PG (ref 26.6–33)
MCHC RBC AUTO-ENTMCNC: 33.2 G/DL (ref 31.5–35.7)
MCV RBC AUTO: 95 FL (ref 79–97)
MICROALBUMIN UR-MCNC: 604.5 UG/ML
MONOCYTES # BLD AUTO: 0.7 X10E3/UL (ref 0.1–0.9)
MONOCYTES NFR BLD AUTO: 9 %
NEUTROPHILS # BLD AUTO: 4.5 X10E3/UL (ref 1.4–7)
NEUTROPHILS NFR BLD AUTO: 61 %
PLATELET # BLD AUTO: 254 X10E3/UL (ref 150–379)
POTASSIUM SERPL-SCNC: 4.9 MMOL/L (ref 3.5–5.2)
PROT SERPL-MCNC: 7.5 G/DL (ref 6–8.5)
RBC # BLD AUTO: 4.05 X10E6/UL (ref 4.14–5.8)
SODIUM SERPL-SCNC: 138 MMOL/L (ref 134–144)
TRIGL SERPL-MCNC: 109 MG/DL (ref 0–149)
VLDLC SERPL CALC-MCNC: 22 MG/DL (ref 5–40)
WBC # BLD AUTO: 7.3 X10E3/UL (ref 3.4–10.8)

## 2017-05-10 ENCOUNTER — PATIENT OUTREACH (OUTPATIENT)
Dept: FAMILY MEDICINE CLINIC | Age: 52
End: 2017-05-10

## 2017-05-10 NOTE — PROGRESS NOTES
Transitions of Care Coordination    Rissa Saleem was hospitalized at [facility] 5/6-5/8/17 for CP, HTN, ESRD and discharged to home. RRAT = 22    Discharge Summary written by Dr. Parag Leong on 1/9/21 is below in italics. Discharge Diagnosis:   Chest pains. HTN   Renal failure     Discharge instructions:   # Discharge Diet:  Diet: Cardiac Diet and Renal Diet  # Discharge activity and restrictions: Activity as tolerated     # Follow-up appointments:      Follow-up Information     Follow up With Details Comments Contact Info     Jessica Winn MD     [address]  [address] [facility] [address]               HPI on Admission (per admitting physician): This is a 61-year-old gentleman with past medical history significant for end-stage renal disease on hemodialysis, CHF, diabetes mellitus and hypertension who presents to the emergency room with chest pain that started around 9 p.m., described as dull, pressure-like, center of chest, radiating to the left arm. Not associated with any nausea, vomiting, lightheadedness, dizziness. No diaphoresis, not related to exertion, lasting for about half an hour, fully resolved. During the ED course, a troponin was checked and negative. EKG was also within normal range. The patient placed in observation status for further management of chest pain and acute coronary syndrome rule out. He does have significant risk factors for coronary artery disease including tobacco use, diabetes mellitus, hypertension. For further details and initial management please refer to H/P.      Hospital Course:      By Problems :   1. Precordial chest pain. negative trend cardiac enzymes, telemonitoring. EF 60 ./ on  2D echocardiogram. No evidence of acute ischemia . Troponin negative; NM stress test with no evidence of ischemia as reported. Seen by cardiology team. Chest pain free. Will d/c home if ok with cardiology and nephrology. 2. End-stage renal disease on hemodialysis. Continue HD as per prior to admission. Pt on renal transplant assessment list.  3. Acute hyperkalemia. Treated and K level normalized. trend potassium, telemonitoring. Follow with his MDs as OP. 4. Hyperglycemia. Normalized BSL. Back to his home regimen with PCP follow up , monitored fingersticks q.a.c. and at bedtime, diabetic diet, hypoglycemia protocol . A1c 6.9.  5. Hypertension. Stable, continue home regimen on discharge. Need more optimal control and monitoring for his BP.     Discharge condition:  improved     Disposition: Home     Procedures:   NM stress test     Consultants: Cardiology    Reached patient and identified self/role. Mr. Bobby Schaffer stated \"I feel fine now. \"  Reconciled medications and patient stated he is taking as directed. No medication changes were made on discharge. Patient attends dialysis on a T/T/S schedule and transportation is provided. The patient stated he was not made aware of the results of his echo and cardiac cath before leaving the hospital.  Patient requested copies of test results. Provided general information and advised an appointment with Dr. Arlin Abbott to review results. Patient agreeable and an appointment was scheduled for 5/15/17 at 1000. Mr. Bboby Schaffer stated he does not have a functioning blood sugar machine. Will notify office of Dr. Arlin Abbott. Patient voiced no other questions or concerns. Will follow.

## 2017-05-15 ENCOUNTER — OFFICE VISIT (OUTPATIENT)
Dept: FAMILY MEDICINE CLINIC | Age: 52
End: 2017-05-15

## 2017-05-15 VITALS
TEMPERATURE: 96.4 F | HEART RATE: 75 BPM | WEIGHT: 260 LBS | BODY MASS INDEX: 38.51 KG/M2 | HEIGHT: 69 IN | SYSTOLIC BLOOD PRESSURE: 147 MMHG | DIASTOLIC BLOOD PRESSURE: 68 MMHG | RESPIRATION RATE: 18 BRPM

## 2017-05-15 DIAGNOSIS — R07.2 PRECORDIAL CHEST PAIN: Primary | ICD-10-CM

## 2017-05-15 NOTE — PROGRESS NOTES
HISTORY OF PRESENT ILLNESS  Adithya Archuleta is a 54 y.o. male. HPI Comments: Patient was recently admitted on 5/6/17 to 5/8/17 at [address] [place] [address] for chest pain that started around 9 p.m., described as dull, pressure-like, center of chest, radiating to the left arm. Not associated with any nausea, vomiting, lightheadedness, dizziness. No diaphoresis, not related to exertion, lasting for about half an hour, fully resolved. During the ED course, a troponin was checked and negative. EKG was also within normal range. The patient placed in observation status for further management of chest pain and acute coronary syndrome rule out. He does have significant risk factors for coronary artery disease including tobacco use, diabetes mellitus, hypertension. Patient had cardiac work up which was negative and a cardiac consultation. His nephrologist was also aware. Patient is feeling better today and I will not be making any changes to his medications. Hospital Follow Up   The history is provided by the patient. This is a new problem. The problem has been gradually improving. Pertinent negatives include no chest pain, no abdominal pain, no headaches and no shortness of breath. Nothing aggravates the symptoms. Nothing relieves the symptoms. He has tried nothing for the symptoms. Review of Systems   Constitutional: Negative for chills, diaphoresis, fever and malaise/fatigue. HENT: Negative for nosebleeds, sore throat and tinnitus. Eyes: Negative for blurred vision, double vision, pain and discharge. Respiratory: Negative for cough, sputum production, shortness of breath, wheezing and stridor. Cardiovascular: Negative for chest pain, palpitations, claudication and leg swelling. Gastrointestinal: Negative for abdominal pain, constipation, diarrhea, nausea and vomiting. Neurological: Negative for dizziness, weakness and headaches. Psychiatric/Behavioral: The patient is nervous/anxious.       Visit Vitals    /68 (BP 1 Location: Left arm, BP Patient Position: Sitting)    Pulse 75    Temp 96.4 °F (35.8 °C) (Oral)    Resp 18    Ht 5' 9\" (1.753 m)    Wt 260 lb (117.9 kg)    BMI 38.4 kg/m2       Physical Exam   Constitutional: He is oriented to person, place, and time. He appears well-developed and well-nourished. No distress. HENT:   Head: Normocephalic and atraumatic. Right Ear: External ear normal.   Left Ear: External ear normal.   Mouth/Throat: Oropharynx is clear and moist.   Eyes: EOM are normal. Pupils are equal, round, and reactive to light. No scleral icterus. Neck: Normal range of motion. No thyromegaly present. Cardiovascular: Normal rate and normal heart sounds. Pulmonary/Chest: Effort normal and breath sounds normal. No respiratory distress. He has no wheezes. Abdominal: Soft. Bowel sounds are normal. He exhibits no distension. There is no tenderness. Lymphadenopathy:     He has no cervical adenopathy. Neurological: He is alert and oriented to person, place, and time. Psychiatric: He has a normal mood and affect.        ASSESSMENT and PLAN    Precordial chest pain :  - Reviewed records  - Continue with medications

## 2017-05-15 NOTE — PROGRESS NOTES
Patient is here  Hospital for chest pain. 1. Have you been to the ER, urgent care clinic since your last visit? Hospitalized since your last visit?no    2. Have you seen or consulted any other health care providers outside of the [address] [place] [address] since your last visit? Include any pap smears or colon screening.  no

## 2017-05-15 NOTE — LETTER
NOTIFICATION RETURN TO WORK / SCHOOL 
 
5/15/2017 10:10 AM 
 
Mr. Joan Con[facility] [address] [facility] [address] [place] [id number] To Whom It May Concern: 
 
Joan Conely is currently under the care of [address] [place] [address]. He will return to physical therapy from 5/15/17. It is ok for him to resume his sessions. If there are questions or concerns please have the patient contact our office. Sincerely, Scarlett Snellen, MD

## 2017-05-19 ENCOUNTER — PATIENT OUTREACH (OUTPATIENT)
Dept: FAMILY MEDICINE CLINIC | Age: 52
End: 2017-05-19

## 2017-05-19 NOTE — PROGRESS NOTES
Transitions of Care Coordination    Follow up for hospitalzation at [facility] 5/6-5/8/17 for CP, HTN, ESRD. The patient attended an appointment with Dr. Alpa Oneill on 5/15/17. Per officer visit notes CP has resolved. No C/o abdominal pain, headache or SOB. No medication changes noted. VSS. Will follow.

## 2017-06-08 ENCOUNTER — PATIENT OUTREACH (OUTPATIENT)
Dept: FAMILY MEDICINE CLINIC | Age: 52
End: 2017-06-08

## 2017-06-08 NOTE — PROGRESS NOTES
Transitions of Care Coordination    Follow up for hospitalzation at [facility] 5/6-5/8/17 for CP, HTN, ESRD. Goal met. Episode resolved:  No hospitalization or ED visit 30 days from discharge on 5/8/17.

## 2017-06-30 ENCOUNTER — TELEPHONE (OUTPATIENT)
Dept: FAMILY MEDICINE CLINIC | Age: 52
End: 2017-06-30

## 2017-06-30 DIAGNOSIS — Z79.4 TYPE 2 DIABETES MELLITUS WITHOUT COMPLICATION, WITH LONG-TERM CURRENT USE OF INSULIN (HCC): ICD-10-CM

## 2017-06-30 DIAGNOSIS — E11.9 TYPE 2 DIABETES MELLITUS WITHOUT COMPLICATION, WITH LONG-TERM CURRENT USE OF INSULIN (HCC): ICD-10-CM

## 2017-06-30 NOTE — TELEPHONE ENCOUNTER
Requested Prescriptions     Pending Prescriptions Disp Refills    insulin NPH/insulin regular (HUMULIN 70/30) 100 unit/mL (70-30) injection 1 Vial 5     Sig: Inject 30 units sc daily     Pt states need medication ASAP.  States requested medication through pharmacy a couple of days ago

## 2017-07-07 ENCOUNTER — OFFICE VISIT (OUTPATIENT)
Dept: FAMILY MEDICINE CLINIC | Age: 52
End: 2017-07-07

## 2017-07-07 VITALS
DIASTOLIC BLOOD PRESSURE: 76 MMHG | HEART RATE: 67 BPM | OXYGEN SATURATION: 97 % | BODY MASS INDEX: 39.09 KG/M2 | SYSTOLIC BLOOD PRESSURE: 147 MMHG | HEIGHT: 69 IN | TEMPERATURE: 98 F | WEIGHT: 263.9 LBS | RESPIRATION RATE: 18 BRPM

## 2017-07-07 DIAGNOSIS — L30.8 PSORIASIFORM ECZEMA: Primary | ICD-10-CM

## 2017-07-07 RX ORDER — CALCIPOTRIENE, BETAMETHASONE DIPROPIONATE 50; .643 UG/G; MG/G
OINTMENT TOPICAL DAILY
Qty: 60 G | Refills: 0 | Status: SHIPPED | OUTPATIENT
Start: 2017-07-07 | End: 2019-01-23

## 2017-07-07 RX ORDER — METHYLPREDNISOLONE 4 MG/1
TABLET ORAL
Qty: 1 DOSE PACK | Refills: 0 | Status: SHIPPED | OUTPATIENT
Start: 2017-07-07 | End: 2017-11-06 | Stop reason: ALTCHOICE

## 2017-07-07 NOTE — PROGRESS NOTES
HISTORY OF PRESENT ILLNESS  Renee Vick is a 54 y.o. male. Rash    The history is provided by the patient. This is a new problem. The current episode started more than 1 week ago. The problem has been gradually worsening. There has been no fever. The rash is present on the left arm, left hand, right hand and right arm. The pain is at a severity of 4/10. The pain is moderate. The pain has been constant since onset. Associated symptoms include itching. He has tried nothing for the symptoms. Review of Systems   Constitutional: Negative for chills, fever and malaise/fatigue. HENT: Negative for congestion, ear pain, nosebleeds and sore throat. Eyes: Negative for blurred vision, double vision, pain and discharge. Respiratory: Negative for cough, shortness of breath and wheezing. Cardiovascular: Negative for chest pain, palpitations and leg swelling. Gastrointestinal: Negative for abdominal pain, blood in stool, constipation and diarrhea. Genitourinary: Negative for dysuria, frequency and hematuria. Musculoskeletal: Negative for joint pain and myalgias. Skin: Positive for itching and rash. Neurological: Negative for dizziness, tingling and headaches. Psychiatric/Behavioral: Negative for depression. The patient is not nervous/anxious. Visit Vitals    /76    Pulse 67    Temp 98 °F (36.7 °C) (Oral)    Resp 18    Ht 5' 9\" (1.753 m)    Wt 263 lb 14.4 oz (119.7 kg)    SpO2 97%    BMI 38.97 kg/m2       Physical Exam   Constitutional: He is oriented to person, place, and time. He appears well-developed and well-nourished. No distress. HENT:   Head: Normocephalic and atraumatic. Right Ear: External ear normal.   Left Ear: External ear normal.   Mouth/Throat: Oropharynx is clear and moist. No oropharyngeal exudate. Eyes: EOM are normal. Pupils are equal, round, and reactive to light. Neck: Normal range of motion. No thyromegaly present.    Cardiovascular: Normal rate, regular rhythm and normal heart sounds. Pulmonary/Chest: Effort normal and breath sounds normal. No respiratory distress. He has no wheezes. Abdominal: Soft. Bowel sounds are normal. He exhibits no distension. There is no tenderness. Lymphadenopathy:     He has no cervical adenopathy. Neurological: He is alert and oriented to person, place, and time. Skin: Skin is dry. Rash noted. There is erythema. Dry , scaly , silver scales on back , arms. Psychiatric: He has a normal mood and affect. ASSESSMENT and PLAN  Psoriasis :  - Continue to use /ointment over affected areas, please wrap the affected part up to keep area moist.  - Begin medrol dose pack , discussed risk due to current renal failure but this is short term treatment  -Ok to use calcipotriene creams which is a form of vitamin D.  - You will need ultraviolet light (UVA/UVB)  -Referral to dermatology to discuss possible biologics for diagnosis if not improved with topicals. Please keep skin moisturized and you can also use coconut oil.

## 2017-07-07 NOTE — MR AVS SNAPSHOT
Visit Information Date & Time Provider Department Dept. Phone Encounter #  
 7/7/2017  8:45 AM Jose Manuel Burkett MD [address] [facility] [phone] [id number] Your Appointments 8/9/2017  9:00 AM  
ROUTINE CARE with Jose Manuel Burkett MD  
[address] ([address]) Appt Note: 3 month follow up  
 [address] [facility] [address] [address]  
[phone]  
  
   
 [address] [facility] [address] Upcoming Health Maintenance Date Due Pneumococcal 19-64 Highest Risk (1 of 3 - PCV13) 8/29/1986 DTaP/Tdap/Td series (1 - Tdap) 8/29/1988 EYE EXAM RETINAL OR DILATED Q1 2/25/2015 FOOT EXAM Q1 4/22/2017 INFLUENZA AGE 9 TO ADULT 8/1/2017 HEMOGLOBIN A1C Q6M 11/3/2017 MICROALBUMIN Q1 5/3/2018 LIPID PANEL Q1 5/8/2018 Allergies as of 7/7/2017  Review Complete On: 7/7/2017 By: Minerva Dunham LPN No Known Allergies Current Immunizations  Never Reviewed No immunizations on file. Not reviewed this visit You Were Diagnosed With   
  
 Codes Comments Psoriasiform eczema    -  Primary ICD-10-CM: L30.8 ICD-9-CM: 696.8 Vitals BP Pulse Temp Resp Height(growth percentile) Weight(growth percentile) 147/76 67 98 °F (36.7 °C) (Oral) 18 5' 9\" (1.753 m) 263 lb 14.4 oz (119.7 kg) SpO2 BMI Smoking Status 97% 38.97 kg/m2 Current Some Day Smoker Vitals History BMI and BSA Data Body Mass Index Body Surface Area  
 38.97 kg/m 2 2.41 m 2 Preferred Pharmacy Pharmacy Name Phone [facility] [address], [address] [facility] [phone] Your Updated Medication List  
  
   
This list is accurate as of: 7/7/17  9:10 AM.  Always use your most recent med list. amLODIPine 10 mg tablet Commonly known as:  Unknown Kennan Take  by mouth daily.   
  
 ammonium lactate 12 % topical cream  
 Commonly known as:  LAC-HYDRIN Apply  to affected area two (2) times a day. rub in to affected area well  
  
 calcipotriene-betamethasone 0.005-0.064 % topical ointment Commonly known as:  Angi Loft Apply  to affected area daily. calcium acetate 667 mg Cap Commonly known as:  PHOSLO Take 1 Cap by mouth three (3) times daily (with meals). carvedilol 25 mg tablet Commonly known as:  Juanita Cheyenne Take 1 Tab by mouth daily. hydrALAZINE 100 mg tablet Commonly known as:  APRESOLINE Take 1 Tab by mouth daily. insulin NPH/insulin regular 100 unit/mL (70-30) injection Commonly known as:  HumuLIN 70/30  
30 Units by SubCUTAneous route two (2) times a day. methylPREDNISolone 4 mg tablet Commonly known as:  Maik Sales Take as directed on pack  Indications: ECZEMA  
  
 PROVENTIL HFA 90 mcg/actuation inhaler Generic drug:  albuterol Take  by inhalation. RENAL SOFTGELS PO Take  by mouth daily. simvastatin 10 mg tablet Commonly known as:  ZOCOR Take  by mouth nightly. Prescriptions Sent to Pharmacy Refills  
 methylPREDNISolone (MEDROL DOSEPACK) 4 mg tablet 0 Sig: Take as directed on pack  Indications: ECZEMA Class: Normal  
 Pharmacy: [address] [place] [address] [facility] Ph #: [phone]  
 calcipotriene-betamethasone (TACLONEX) 0.005-0.064 % topical ointment 0 Sig: Apply  to affected area daily. Class: Normal  
 Pharmacy: [address] [place] [address] [facility] Ph #: [phone] Route: Topical  
  
Introducing [facility]! Diandra Talamantes introduces [employer] patient portal. Now you can access parts of your medical record, email your doctor's office, and request medication refills online. 1. In your internet browser, go to [email or web address] 2. Click on the First Time User? Click Here link in the Sign In box. You will see the New Member Sign Up page. 3. Enter your [employer] Access Code exactly as it appears below. You will not need to use this code after youve completed the sign-up process. If you do not sign up before the expiration date, you must request a new code. · [employer] Access Code: Romina Gamez  Expires: 8/1/2017  9:59 AM 
 
4. Enter the last four digits of your Social Security Number (xxxx) and Date of Birth (mm/dd/yyyy) as indicated and click Submit. You will be taken to the next sign-up page. 5. Create a [employer] ID. This will be your [employer] login ID and cannot be changed, so think of one that is secure and easy to remember. 6. Create a [employer] password. You can change your password at any time. 7. Enter your Password Reset Question and Answer. This can be used at a later time if you forget your password. 8. Enter your e-mail address. You will receive e-mail notification when new information is available in [facility] [address] [facility] [address]. 9. Click Sign Up. You can now view and download portions of your medical record. 10. Click the Download Summary menu link to download a portable copy of your medical information. If you have questions, please visit the Frequently Asked Questions section of the [employer] website. Remember, [employer] is NOT to be used for urgent needs. For medical emergencies, dial 911. Now available from your iPhone and Android! Please provide this summary of care documentation to your next provider. Your primary care clinician is listed as Karla Rodriguez. If you have any questions after today's visit, please call [phone].

## 2017-07-07 NOTE — PROGRESS NOTES
Chief Complaint   Patient presents with    Rash     Rash on both arms for about 2 weeks. 1. Have you been to the ER, urgent care clinic since your last visit? Hospitalized since your last visit? No    2. Have you seen or consulted any other health care providers outside of the [facility] since your last visit? Include any pap smears or colon screening.  No

## 2017-07-10 ENCOUNTER — TELEPHONE (OUTPATIENT)
Dept: FAMILY MEDICINE CLINIC | Age: 52
End: 2017-07-10

## 2017-07-13 NOTE — TELEPHONE ENCOUNTER
Pt following up on PA for ointment. Pt states or please rx another medication that will be covered by insurance.

## 2017-07-14 NOTE — TELEPHONE ENCOUNTER
Pharmacist unable to split RX.  Insurance suggested to try [address] first. Steroid cream has been filled

## 2017-07-14 NOTE — TELEPHONE ENCOUNTER
Called pharmacy, medication is 700 dollars. Pharmacist stated he will split the rx to see if it will be covered. If not will call us and patient. Patient aware.

## 2017-07-17 NOTE — TELEPHONE ENCOUNTER
Dr. Martins Presume insurance will not cover cream. Patient did receive steroid. Is the another cream you can send to the pharmacy. ?

## 2017-09-12 DIAGNOSIS — I10 ESSENTIAL HYPERTENSION: ICD-10-CM

## 2017-09-12 RX ORDER — CARVEDILOL 25 MG/1
TABLET ORAL
Qty: 30 TAB | Refills: 3 | Status: SHIPPED | OUTPATIENT
Start: 2017-09-12 | End: 2018-03-04

## 2017-11-06 ENCOUNTER — OFFICE VISIT (OUTPATIENT)
Dept: FAMILY MEDICINE CLINIC | Age: 52
End: 2017-11-06

## 2017-11-06 ENCOUNTER — HOSPITAL ENCOUNTER (OUTPATIENT)
Dept: LAB | Age: 52
Discharge: HOME | End: 2017-11-06
Payer: MEDICARE

## 2017-11-06 VITALS
TEMPERATURE: 96.5 F | HEIGHT: 69 IN | SYSTOLIC BLOOD PRESSURE: 175 MMHG | OXYGEN SATURATION: 98 % | DIASTOLIC BLOOD PRESSURE: 81 MMHG | RESPIRATION RATE: 16 BRPM | HEART RATE: 73 BPM

## 2017-11-06 DIAGNOSIS — E11.9 TYPE 2 DIABETES MELLITUS WITHOUT COMPLICATION, WITHOUT LONG-TERM CURRENT USE OF INSULIN (HCC): Primary | ICD-10-CM

## 2017-11-06 DIAGNOSIS — E11.9 TYPE 2 DIABETES MELLITUS WITHOUT COMPLICATION, WITHOUT LONG-TERM CURRENT USE OF INSULIN (HCC): ICD-10-CM

## 2017-11-06 LAB
EST. AVERAGE GLUCOSE BLD GHB EST-MCNC: 194 MG/DL
HBA1C MFR BLD: 8.4 % (ref 4.2–5.6)

## 2017-11-06 PROCEDURE — 83036 HEMOGLOBIN GLYCOSYLATED A1C: CPT | Performed by: FAMILY MEDICINE

## 2017-11-06 RX ORDER — ALBUTEROL SULFATE 90 UG/1
2 AEROSOL, METERED RESPIRATORY (INHALATION)
Qty: 1 INHALER | Refills: 3 | Status: SHIPPED | OUTPATIENT
Start: 2017-11-06

## 2017-11-06 NOTE — PROGRESS NOTES
Patient is here for dm and htn visit. 1. Have you been to the ER, urgent care clinic since your last visit? Hospitalized since your last visit?no    2. Have you seen or consulted any other health care providers outside of the [address] [place] [address] since your last visit? Include any pap smears or colon screening.  no

## 2017-11-06 NOTE — PROGRESS NOTES
HISTORY OF PRESENT ILLNESS  Mago Angel is a 50 y.o. male. HPI Comments: Patient is here for DM/HTN follow up and he has been taking his medications. He is due for some blood work. He continues to undergo renal dialysis. He has not taken his blood pressure medication this am.     Diabetes   The history is provided by the patient. This is a chronic problem. The problem occurs constantly. The problem has not changed since onset. Associated symptoms include shortness of breath. Pertinent negatives include no chest pain, no abdominal pain and no headaches. The symptoms are aggravated by stress. The symptoms are relieved by medications. Treatments tried: insulin  The treatment provided moderate relief. Hypertension    The history is provided by the patient. This is a chronic problem. The problem has not changed since onset. Associated symptoms include shortness of breath. Pertinent negatives include no chest pain, no anxiety, no confusion, no malaise/fatigue, no headaches, no neck pain, no peripheral edema, no dizziness, no nausea and no vomiting. Risk factors include hypertension, stress, male gender, obesity and diabetes mellitus. Review of Systems   Constitutional: Negative for chills, fever and malaise/fatigue. HENT: Negative for congestion, ear pain, hearing loss and sore throat. Respiratory: Positive for shortness of breath. Negative for cough and wheezing. Cardiovascular: Negative for chest pain. Gastrointestinal: Negative for abdominal pain, blood in stool, nausea and vomiting. Genitourinary: Negative for dysuria and frequency. Musculoskeletal: Positive for joint pain. Negative for neck pain. Neurological: Negative for dizziness, tingling, focal weakness and headaches. Psychiatric/Behavioral: Negative for confusion, depression and suicidal ideas. The patient has insomnia.       Visit Vitals    /81    Pulse 73    Temp 96.5 °F (35.8 °C) (Oral)    Resp 16    Ht 5' 9\" (1.753 m)  SpO2 98%       Physical Exam   Constitutional: He is oriented to person, place, and time. He appears well-developed and well-nourished. No distress. HENT:   Head: Normocephalic and atraumatic. Right Ear: External ear normal.   Left Ear: External ear normal.   Mouth/Throat: Oropharynx is clear and moist. No oropharyngeal exudate. Eyes: EOM are normal. Pupils are equal, round, and reactive to light. No scleral icterus. Neck: Normal range of motion. No thyromegaly present. Cardiovascular: Normal rate, regular rhythm and normal heart sounds. Pulmonary/Chest: Effort normal and breath sounds normal. No respiratory distress. He has no wheezes. Abdominal: Soft. Bowel sounds are normal. He exhibits no distension. There is no tenderness. Lymphadenopathy:     He has no cervical adenopathy. Neurological: He is alert and oriented to person, place, and time. Psychiatric: He has a normal mood and affect. ASSESSMENT and PLAN  Diabetes :  1)   Goal HBA1C < 7.  2)   Post prandial blood sugar less than or equal to 180.  3)   Exercise 30 min 3-5 times a week for goal BMI of 25.  4)   Please monitor blood sugars as directed and keep a log to bring in with you at each visit. 5)   Diabetic diet discussed and patient instructions to be handed out. 6)   Goal LDL< 100  7)   Goal BP< 120/80 mmhg. 8)   Annual eye exam and foot exam.  9)   Please examine you feet daily for any skin breakages or ulcers. 10) Referral made to see nutritionist for better dietary guidance. 11) Continue current medications as prescribed. 12) Explained the side effects of medication and patient verbalized understanding. 13) Please avoid smoking , alcohol and illicit drug use if applicable to you.  14) Please have blood work ordered today completed. 15) Please make sure you schedule your routine medical exam every 1-2 years.     Hypertension :  1) Goal blood pressure less than equal to 140/90 mmHg, goal BP can vary depending on risk factors as discussed. 2) Lifestyle modifications discussed with patient, low sodium <2 gm, low salt , DASH diet  3) Exercise for at least 30 min 3-5 times a week for goal BMI of less than or equal  To 25.  4) Continue current medications as prescribed. 5) Please begin medication as discussed for better blood pressure control, explained side effects and patient verbalized understanding. 6) Goal LDL<100.  7) Please monitor your blood pressure and keep a log to bring in with you at each visit. 8) Discussed risk factors with patient such as CAD, FAST of stroke symptoms, pt verbalizes understanding. 9) Please avoid smoking , alcohol and any illicit drug use if applicable to you.   10) Discussed lifestyle modifications ,dietary control and BP monitoring at home

## 2018-01-10 DIAGNOSIS — E11.9 TYPE 2 DIABETES MELLITUS WITHOUT COMPLICATION, WITH LONG-TERM CURRENT USE OF INSULIN (HCC): ICD-10-CM

## 2018-01-10 DIAGNOSIS — Z79.4 TYPE 2 DIABETES MELLITUS WITHOUT COMPLICATION, WITH LONG-TERM CURRENT USE OF INSULIN (HCC): ICD-10-CM

## 2018-01-10 NOTE — TELEPHONE ENCOUNTER
Requested Prescriptions     Pending Prescriptions Disp Refills    insulin NPH/insulin regular (HUMULIN 70/30) 100 unit/mL (70-30) injection 1 Vial 5     Si Units by SubCUTAneous route two (2) times a day.

## 2018-01-17 ENCOUNTER — OFFICE VISIT (OUTPATIENT)
Dept: FAMILY MEDICINE CLINIC | Age: 53
End: 2018-01-17

## 2018-01-17 VITALS
OXYGEN SATURATION: 94 % | DIASTOLIC BLOOD PRESSURE: 69 MMHG | SYSTOLIC BLOOD PRESSURE: 145 MMHG | HEIGHT: 69 IN | RESPIRATION RATE: 16 BRPM | HEART RATE: 78 BPM | WEIGHT: 249 LBS | BODY MASS INDEX: 36.88 KG/M2 | TEMPERATURE: 98.6 F

## 2018-01-17 DIAGNOSIS — J40 BRONCHITIS: ICD-10-CM

## 2018-01-17 DIAGNOSIS — R52 BODY ACHES: Primary | ICD-10-CM

## 2018-01-17 PROBLEM — E11.21 TYPE 2 DIABETES MELLITUS WITH NEPHROPATHY (HCC): Status: ACTIVE | Noted: 2018-01-17

## 2018-01-17 RX ORDER — LEVOFLOXACIN 750 MG/1
750 TABLET ORAL DAILY
Qty: 10 TAB | Refills: 0 | Status: SHIPPED | OUTPATIENT
Start: 2018-01-17 | End: 2018-01-27

## 2018-01-17 RX ORDER — HYDROCODONE POLISTIREX AND CHLORPHENIRAMINE POLISTIREX 10; 8 MG/5ML; MG/5ML
1 SUSPENSION, EXTENDED RELEASE ORAL
Qty: 115 ML | Refills: 0 | Status: SHIPPED | OUTPATIENT
Start: 2018-01-17 | End: 2018-03-04

## 2018-01-17 RX ORDER — ALBUTEROL SULFATE 90 UG/1
1 AEROSOL, METERED RESPIRATORY (INHALATION)
Qty: 1 INHALER | Refills: 3 | Status: SHIPPED | OUTPATIENT
Start: 2018-01-17 | End: 2018-03-04

## 2018-01-17 NOTE — MR AVS SNAPSHOT
Robyn Jeter 
 
 
 [address] [facility] [address] [address] 
[phone] Patient: Janice Morelos MRN: [id number] :1965 Visit Information Date & Time Provider Department Dept. Phone Encounter #  
 2018  9:15 AM Jak Cox MD 28[address] [facility] [phone] [id number] Follow-up Instructions Return in about 3 months (around 2018) for htn . Follow-up and Disposition History Your Appointments 2018 10:00 AM  
ROUTINE CARE with Jak Cox MD  
[address] [facility] (Nadege Al) Appt Note: Return in about 3 months (around 2018) for dm/htn.; 6 months DM follow up  
 [address] [place] [address] [address] [address]  
[phone]  
  
   
 [address] [place] [address] [address] Upcoming Health Maintenance Date Due  
 EYE EXAM RETINAL OR DILATED Q1 2015 FOOT EXAM Q1 2017 Influenza Age 5 to Adult 2017 FOBT Q 1 YEAR AGE 50-75 2017 MICROALBUMIN Q1 5/3/2018 HEMOGLOBIN A1C Q6M 2018 LIPID PANEL Q1 2018 Pneumococcal 19-64 Highest Risk (2 of 3 - PCV13) 2018 DTaP/Tdap/Td series (2 - Td) 2027 Allergies as of 2018  Review Complete On: 2018 By: Jak Cox MD  
 No Known Allergies Current Immunizations  Never Reviewed No immunizations on file. Not reviewed this visit You Were Diagnosed With   
  
 Codes Comments Body aches    -  Primary ICD-10-CM: Y66 ICD-9-CM: 780.96 Bronchitis     ICD-10-CM: J40 ICD-9-CM: 281 Vitals BP Pulse Temp Resp Height(growth percentile) Weight(growth percentile) 145/69 78 98.6 °F (37 °C) (Oral) 16 5' 9\" (1.753 m) 249 lb (112.9 kg) SpO2 BMI Smoking Status 94% 36.77 kg/m2 Current Some Day Smoker BMI and BSA Data  Body Mass Index Body Surface Area  
 36.77 kg/m 2 2.34 m 2  
  
  
 Preferred Pharmacy Pharmacy Name Phone [address], [address] [facility] Yuliya Vaughan [phone] Your Updated Medication List  
  
   
This list is accurate as of: 1/17/18  9:48 AM.  Always use your most recent med list.  
  
  
  
  
 * albuterol 90 mcg/actuation inhaler Commonly known as:  PROVENTIL HFA Take 2 Puffs by inhalation every four (4) hours as needed for Wheezing. * albuterol 90 mcg/actuation inhaler Commonly known as:  PROVENTIL HFA, VENTOLIN HFA, PROAIR HFA Take 1 Puff by inhalation every six (6) hours as needed for Wheezing or Shortness of Breath. Indications: Bronchospastic Pulmonary Disease  
  
 amLODIPine 10 mg tablet Commonly known as:  Anastacia Boozer Take  by mouth daily. ammonium lactate 12 % topical cream  
Commonly known as:  LAC-HYDRIN Apply  to affected area two (2) times a day. rub in to affected area well  
  
 calcipotriene-betamethasone 0.005-0.064 % topical ointment Commonly known as:  Dulcy Black Apply  to affected area daily. calcium acetate 667 mg Cap Commonly known as:  PHOSLO Take 1 Cap by mouth three (3) times daily (with meals). carvedilol 25 mg tablet Commonly known as:  COREG  
TAKE ONE TABLET BY MOUTH ONCE DAILY  
  
 chlorpheniramine-HYDROcodone 10-8 mg/5 mL suspension Commonly known as:  Edith Cheema Take 5 mL by mouth every twelve (12) hours as needed for Cough. Max Daily Amount: 10 mL. Indications: Cough  
  
 hydrALAZINE 100 mg tablet Commonly known as:  APRESOLINE Take 1 Tab by mouth daily. insulin NPH/insulin regular 100 unit/mL (70-30) injection Commonly known as:  HumuLIN 70/30  
30 Units by SubCUTAneous route two (2) times a day. levoFLOXacin 750 mg tablet Commonly known as:  Tariq Langford Take 1 Tab by mouth daily for 10 days. RENAL SOFTGELS PO Take  by mouth daily. simvastatin 10 mg tablet Commonly known as:  ZOCOR Take  by mouth nightly. * Notice: This list has 2 medication(s) that are the same as other medications prescribed for you. Read the directions carefully, and ask your doctor or other care provider to review them with you. Prescriptions Printed Refills  
 chlorpheniramine-HYDROcodone (TUSSIONEX) 10-8 mg/5 mL suspension 0 Sig: Take 5 mL by mouth every twelve (12) hours as needed for Cough. Max Daily Amount: 10 mL. Indications: Cough Class: Print Route: Oral  
  
Prescriptions Sent to Pharmacy Refills  
 levoFLOXacin (LEVAQUIN) 750 mg tablet 0 Sig: Take 1 Tab by mouth daily for 10 days. Class: Normal  
 Pharmacy: [address] [place] [address] Ph #: [phone] Route: Oral  
 albuterol (PROVENTIL HFA, VENTOLIN HFA, PROAIR HFA) 90 mcg/actuation inhaler 3 Sig: Take 1 Puff by inhalation every six (6) hours as needed for Wheezing or Shortness of Breath. Indications: Bronchospastic Pulmonary Disease Class: Normal  
 Pharmacy: [address] [place] [address] Ph #: [phone] Route: Inhalation We Performed the Following AMB POC RAPID INFLUENZA TEST [45155 CPT(R)] Follow-up Instructions Return in about 3 months (around 4/17/2018) for htn . Introducing [facility]! Lars Enamorado introduces [employer] patient portal. Now you can access parts of your medical record, email your doctor's office, and request medication refills online. 1. In your internet browser, go to [email or web address] 2. Click on the First Time User? Click Here link in the Sign In box. You will see the New Member Sign Up page. 3. Enter your [employer] Access Code exactly as it appears below. You will not need to use this code after youve completed the sign-up process. If you do not sign up before the expiration date, you must request a new code. · [employer] Access Code: [email or web address] Expires: 2/4/2018  9:22 AM 
 
 4. Enter the last four digits of your Social Security Number (xxxx) and Date of Birth (mm/dd/yyyy) as indicated and click Submit. You will be taken to the next sign-up page. 5. Create a [employer] ID. This will be your [employer] login ID and cannot be changed, so think of one that is secure and easy to remember. 6. Create a [employer] password. You can change your password at any time. 7. Enter your Password Reset Question and Answer. This can be used at a later time if you forget your password. 8. Enter your e-mail address. You will receive e-mail notification when new information is available in [address] 9. Click Sign Up. You can now view and download portions of your medical record. 10. Click the Download Summary menu link to download a portable copy of your medical information. If you have questions, please visit the Frequently Asked Questions section of the [employer] website. Remember, [employer] is NOT to be used for urgent needs. For medical emergencies, dial 911. Now available from your iPhone and Android! Please provide this summary of care documentation to your next provider. Your primary care clinician is listed as Karla NATION [employer] Harish. If you have any questions after today's visit, please call [phone].

## 2018-01-17 NOTE — PROGRESS NOTES
Patient is here for c/o of  Productive cough, sob, congestion and neck pain. 1. Have you been to the ER, urgent care clinic since your last visit? Hospitalized since your last visit?no    2. Have you seen or consulted any other health care providers outside of the [address] [place] [address] since your last visit? Include any pap smears or colon screening.  no

## 2018-01-19 ENCOUNTER — TELEPHONE (OUTPATIENT)
Dept: FAMILY MEDICINE CLINIC | Age: 53
End: 2018-01-19

## 2018-01-19 NOTE — TELEPHONE ENCOUNTER
Neha Pereira states medication tussinex not covered by [employer] or medicare. States please send another rx that will be less expensive for pt. States out of pocket will be 77.00 dollars for pt and he cannot afford that.

## 2018-01-26 NOTE — TELEPHONE ENCOUNTER
Informed patient, I spoke with insurance and their is no other alternative including generic that his insurance will cover. Patient will get something otc.

## 2018-02-21 ENCOUNTER — OFFICE VISIT (OUTPATIENT)
Dept: FAMILY MEDICINE CLINIC | Age: 53
End: 2018-02-21

## 2018-02-21 VITALS
WEIGHT: 248 LBS | HEART RATE: 77 BPM | BODY MASS INDEX: 36.73 KG/M2 | RESPIRATION RATE: 18 BRPM | TEMPERATURE: 98.3 F | OXYGEN SATURATION: 100 % | SYSTOLIC BLOOD PRESSURE: 160 MMHG | HEIGHT: 69 IN | DIASTOLIC BLOOD PRESSURE: 74 MMHG

## 2018-02-21 DIAGNOSIS — J32.1 CHRONIC FRONTAL SINUSITIS: Primary | ICD-10-CM

## 2018-02-21 RX ORDER — AZITHROMYCIN 250 MG/1
TABLET, FILM COATED ORAL
Qty: 6 TAB | Refills: 0 | Status: SHIPPED | OUTPATIENT
Start: 2018-02-21 | End: 2018-02-26

## 2018-02-21 RX ORDER — BENZONATATE 100 MG/1
100 CAPSULE ORAL
Qty: 21 CAP | Refills: 0 | Status: SHIPPED | OUTPATIENT
Start: 2018-02-21 | End: 2018-03-04

## 2018-02-21 NOTE — PROGRESS NOTES
HISTORY OF PRESENT ILLNESS  Lesley Duran is a 50 y.o. male. Cough   The history is provided by the patient. This is a new problem. The problem occurs constantly. The problem has been gradually worsening. Associated symptoms include headaches. Pertinent negatives include no chest pain, no abdominal pain and no shortness of breath. The symptoms are aggravated by swallowing and stress. Nothing relieves the symptoms. He has tried nothing for the symptoms. Review of Systems   Constitutional: Negative for chills, fever and malaise/fatigue. HENT: Positive for congestion, sinus pain and sore throat. Eyes: Negative for blurred vision, double vision and photophobia. Respiratory: Positive for cough. Negative for shortness of breath. Cardiovascular: Negative for chest pain, palpitations and leg swelling. Gastrointestinal: Negative for abdominal pain, blood in stool, nausea and vomiting. Musculoskeletal: Negative for joint pain. Ongoing shoulder pain    Neurological: Positive for headaches. Negative for dizziness, tingling and weakness. Psychiatric/Behavioral: Negative for depression. The patient is not nervous/anxious. Visit Vitals    /74    Pulse 77    Temp 98.3 °F (36.8 °C) (Oral)    Resp 18    Ht 5' 9\" (1.753 m)    Wt 248 lb (112.5 kg)    SpO2 100%    BMI 36.62 kg/m2       Physical Exam   Constitutional: He is oriented to person, place, and time. He appears well-developed and well-nourished. No distress. HENT:   Head: Normocephalic and atraumatic. Right Ear: External ear normal.   Left Ear: External ear normal.   Nose: Right sinus exhibits maxillary sinus tenderness and frontal sinus tenderness. Left sinus exhibits maxillary sinus tenderness and frontal sinus tenderness. Mouth/Throat: Posterior oropharyngeal erythema present. Eyes: EOM are normal. Pupils are equal, round, and reactive to light. No scleral icterus. Neck: Normal range of motion.  No thyromegaly present. Cardiovascular: Normal rate, regular rhythm and normal heart sounds. Pulmonary/Chest: Effort normal and breath sounds normal. No respiratory distress. He has no wheezes. Abdominal: Soft. Bowel sounds are normal. He exhibits no distension. There is no tenderness. Lymphadenopathy:     He has no cervical adenopathy. Neurological: He is alert and oriented to person, place, and time. Psychiatric: He has a normal mood and affect. ASSESSMENT and PLAN   Sinusitis :  1) Ok to take tylenol / motrin to relieve pain. 2) Please finish course of antibiotics , explained side effects and patient verbalizes understanding. 3) Mucolytic's such as guaifenesin which can thin out the mucous. 4) Use nasal steroid inhaler and anti-allergy medication. 5) Can use nasal saline spray or Neti-pot. 6) Please keep a humidifier in your bedroom. 7) Patient instructions and information on diagnosis to be handed out.

## 2018-02-21 NOTE — PROGRESS NOTES
Patient is here for f/u for cough, patient was unable to receive tussionex due insurance denied. Patient has been taking otc products. 1. Have you been to the ER, urgent care clinic since your last visit? Hospitalized since your last visit?no    2. Have you seen or consulted any other health care providers outside of the [address] [place] [address] since your last visit? Include any pap smears or colon screening.  no

## 2018-02-28 PROBLEM — J11.1 INFLUENZA: Status: ACTIVE | Noted: 2018-02-28

## 2018-02-28 PROBLEM — E11.9 DM (DIABETES MELLITUS) (HCC): Status: ACTIVE | Noted: 2018-02-28

## 2018-02-28 PROBLEM — Z99.2 ESRD ON DIALYSIS (HCC): Status: ACTIVE | Noted: 2018-02-28

## 2018-02-28 PROBLEM — N18.6 ESRD ON DIALYSIS (HCC): Status: ACTIVE | Noted: 2018-02-28

## 2018-02-28 PROBLEM — R09.02 HYPOXIA: Status: ACTIVE | Noted: 2018-02-28

## 2018-02-28 PROBLEM — R06.03 RESPIRATORY DISTRESS: Status: ACTIVE | Noted: 2018-02-28

## 2018-03-04 PROBLEM — R09.02 HYPOXIA: Status: RESOLVED | Noted: 2018-02-28 | Resolved: 2018-03-04

## 2018-03-04 PROBLEM — R06.03 RESPIRATORY DISTRESS: Status: RESOLVED | Noted: 2018-02-28 | Resolved: 2018-03-04

## 2018-03-04 PROBLEM — J11.1 INFLUENZA: Status: RESOLVED | Noted: 2018-02-28 | Resolved: 2018-03-04

## 2018-03-05 DIAGNOSIS — E11.9 TYPE 2 DIABETES MELLITUS WITHOUT COMPLICATION, WITHOUT LONG-TERM CURRENT USE OF INSULIN (HCC): Primary | ICD-10-CM

## 2018-03-05 NOTE — TELEPHONE ENCOUNTER
Pt requesting RX for glocose meter, test strips & lancets be sent to the [facility] on file. His current meter broke.

## 2018-03-06 ENCOUNTER — PATIENT OUTREACH (OUTPATIENT)
Dept: FAMILY MEDICINE CLINIC | Age: 53
End: 2018-03-06

## 2018-03-06 RX ORDER — INSULIN PUMP SYRINGE, 3 ML
EACH MISCELLANEOUS
Qty: 1 KIT | Refills: 0 | Status: SHIPPED | OUTPATIENT
Start: 2018-03-06 | End: 2018-03-15 | Stop reason: SDUPTHER

## 2018-03-06 NOTE — PROGRESS NOTES
Hospital Discharge Follow-Up      Date/Time:  3/6/2018 10:53 AM    Nurse Navigator(NN) contacted the patient  by telephone to perform post hospital discharge assessment. Verified name and  with patient as identifiers. Provided introduction to self, and explanation of the Nurse Navigator role. Patient states he is feeling better since he is home, denies shortness of breath at rest, minimal with walking. Had dialysis at 6 am this morning, makes him feel weak and tired. Patient was admitted to  [facility] on 18 and discharged on 3/4/18 for Acute Respiratory distress: exacerbated by Flu. The physician discharge summary was available at the time of outreach. Patient contacted within 2 business days of discharge. Inpatient RRAT score: 29      Reviewed discharge instructions and red flags with  patient who verbalizes understanding. patient given an opportunity to ask questions and does not have any further questions or concerns at this time. The patient agrees to contact the PCP office for questions related to their healthcare. NN provided contact information for future reference. Summary of patients top problems:  1. Unable to complete Medication Reconciliation  2. Not monitoring his BG   3. Patient declined [address] orders at discharge: None, patient declined    Medication:   Medication reconciliation was not preformed, patient did not feel like getting his bottles out of his cabinet. Able to review new medications and medications stopped. New medications at discharge include ASA  Does the patient have new prescription(s) to last until follow up with prescribing provider: yes  Prescription Medication total: 10 (pharmacy consult for polypharm needed?) no   Medication changes (dose adjustments or discontinued meds):  Albuterol, Phoslo and Carvedilol    Advance Care Planning:   Does patient have an Advance Directive:  not on file     PCP/Specialist follow up: Future Appointments  Date Time Provider Veronika Thalia   3/12/2018 1:15 PM Letitia Kerr MD [address] [place] [address]   5/7/2018 10:00 AM Karla Collins MD [address] [place] [address]          Goals     None

## 2018-03-07 ENCOUNTER — PATIENT OUTREACH (OUTPATIENT)
Dept: FAMILY MEDICINE CLINIC | Age: 53
End: 2018-03-07

## 2018-03-07 NOTE — PROGRESS NOTES
Transitional Care Follow up         Called patient, feeling better today, more energy, he states, he breathing is OK, denies shortness of breath. He forgot we planned to review his medication bottles today, he requested I call back tomorrow after 1 pm.    Goals Addressed      Prevent complications post hospitalization. 3/6/2018 Nurse Navigator to follow up on delivery of DME, and glucose monitoring supplies. Nurse Navigator scheduled JANIS for 3/12/18. Patient will keep  his JANIS appointment,take his medication as prescribe and ask PCP for refills on new prescription given at discharged. Patient will call Nurse Navigator with questions or concerns. 3/7/2018 Nurse Navigator will call patient after 1 pm 3/8/18 per patient's request to complete  medication reconciliation.  Transportation for appointments                   3/7/2018 Patient will arrange transportation with [facility] or [employer] for 3/12/18 appointment with  Cecilio Seay.

## 2018-03-08 ENCOUNTER — PATIENT OUTREACH (OUTPATIENT)
Dept: FAMILY MEDICINE CLINIC | Age: 53
End: 2018-03-08

## 2018-03-08 NOTE — PROGRESS NOTES
Transitional Care Follow up         Called patient to complete Medication Reconciliation, left voice mail for patient to return my call.

## 2018-03-09 ENCOUNTER — PATIENT OUTREACH (OUTPATIENT)
Dept: FAMILY MEDICINE CLINIC | Age: 53
End: 2018-03-09

## 2018-03-09 NOTE — PROGRESS NOTES
Transitional Care Follow up          Patient has not returned my call. Staff message sent to Dr. Bravo Bell:I could not complete Medication Reconciliation with patient. I am concerned he may not have held his Hydralazine and Norvasc as well as reduce his Coreg to 6.25mg as directed on his AVS from [facility].  He has FU scheduled with you 3/12/18 @1:15pm.

## 2018-03-12 ENCOUNTER — OFFICE VISIT (OUTPATIENT)
Dept: FAMILY MEDICINE CLINIC | Age: 53
End: 2018-03-12

## 2018-03-12 VITALS
OXYGEN SATURATION: 98 % | BODY MASS INDEX: 38.21 KG/M2 | RESPIRATION RATE: 16 BRPM | HEART RATE: 68 BPM | TEMPERATURE: 97.3 F | SYSTOLIC BLOOD PRESSURE: 185 MMHG | HEIGHT: 69 IN | WEIGHT: 258 LBS | DIASTOLIC BLOOD PRESSURE: 82 MMHG

## 2018-03-12 DIAGNOSIS — E11.9 TYPE 2 DIABETES MELLITUS WITHOUT COMPLICATION, WITH LONG-TERM CURRENT USE OF INSULIN (HCC): ICD-10-CM

## 2018-03-12 DIAGNOSIS — Z79.4 TYPE 2 DIABETES MELLITUS WITHOUT COMPLICATION, WITH LONG-TERM CURRENT USE OF INSULIN (HCC): ICD-10-CM

## 2018-03-12 DIAGNOSIS — I10 ESSENTIAL HYPERTENSION: ICD-10-CM

## 2018-03-12 DIAGNOSIS — J11.1 INFLUENZA: ICD-10-CM

## 2018-03-12 DIAGNOSIS — J06.9 ACUTE RESPIRATORY DISEASE: Primary | ICD-10-CM

## 2018-03-12 RX ORDER — HYDRALAZINE HYDROCHLORIDE 100 MG/1
100 TABLET, FILM COATED ORAL DAILY
Qty: 90 TAB | Refills: 0 | Status: ON HOLD | OUTPATIENT
Start: 2018-03-12 | End: 2019-01-29 | Stop reason: SDUPTHER

## 2018-03-12 NOTE — PROGRESS NOTES
HISTORY OF PRESENT ILLNESS  Matheus Nixon is a 50 y.o. male. HPI Comments: Patient is here as a hospital visit after recently being admitted to [facility] from 2/27/18 to 3/4/18 secondary to :  1. Acute Respiratory distress: exacerbated by Flu  2. Influenza A-Reported as flu positive at Urgent care.  Now s/p tamiflu for 5 days. 3. ESRD: Currently on dialysis and followed by nephrology   4. Elevated troponin-likely demand ischemia: No change on echo from previous.  Negative stress test in 05/2017.    5)Diabetes : controlled on 70/30 Insulin at 10 units BID while hospitalized (home dose appears to be 30 BID).    6. Hx of HTN-more hypotensive: Coreg 6.25 BID decreased. Holding norvasc and hydralazine at discharge-consider resuming if BP elevated at follow up.   7. Anemia-likely related to ESRD   8. Hx of GEOVANNA: Continue CPAP at night    Patient was reached out to by nurse navigator on 3/7. I have reviewed imaging and medication reconciliation has been done with patient. He has been taking his medications and feels much better. Today his blood pressure is high and I will add his hydralazine back on. Hospital Follow Up   Pertinent negatives include no chest pain, no abdominal pain, no headaches and no shortness of breath. Review of Systems   Constitutional: Negative for chills, fever and malaise/fatigue. HENT: Negative for ear discharge, ear pain, hearing loss, sinus pain and sore throat. Eyes: Negative for blurred vision, double vision, pain and discharge. Respiratory: Negative for cough and shortness of breath. Cardiovascular: Negative for chest pain, palpitations and leg swelling. Gastrointestinal: Negative for abdominal pain, constipation, diarrhea, nausea and vomiting. Genitourinary: Negative for dysuria, frequency and hematuria. Musculoskeletal: Negative for joint pain. Neurological: Negative for dizziness, tingling, focal weakness, weakness and headaches.    Psychiatric/Behavioral: Negative for depression. The patient is not nervous/anxious. Visit Vitals    /82    Pulse 68    Temp 97.3 °F (36.3 °C) (Oral)    Resp 16    Ht 5' 9\" (1.753 m)    Wt 258 lb (117 kg)    SpO2 98%    BMI 38.1 kg/m2       Physical Exam   Constitutional: He is oriented to person, place, and time. He appears well-developed and well-nourished. No distress. HENT:   Head: Normocephalic and atraumatic. Right Ear: External ear normal.   Left Ear: External ear normal.   Mouth/Throat: Oropharynx is clear and moist. No oropharyngeal exudate. Eyes: EOM are normal. Pupils are equal, round, and reactive to light. No scleral icterus. Neck: Normal range of motion. No thyromegaly present. Cardiovascular: Normal rate, regular rhythm and normal heart sounds. Pulmonary/Chest: Effort normal and breath sounds normal. No respiratory distress. He has no wheezes. Abdominal: Soft. Bowel sounds are normal. He exhibits no distension. There is no tenderness. Lymphadenopathy:     He has no cervical adenopathy. Neurological: He is alert and oriented to person, place, and time. Psychiatric: He has a normal mood and affect. ASSESSMENT and PLAN  Hypertension :  1) Goal blood pressure less than equal to 140/90 mmHg, goal BP can vary depending on risk factors as discussed. 2) Lifestyle modifications discussed with patient, low sodium <2 gm, low salt , DASH diet  3) Exercise for at least 30 min 3-5 times a week for goal BMI of less than or equal  To 25.  4) Continue current medications as prescribed. 5) Please begin medication as discussed for better blood pressure control, explained side effects and patient verbalized understanding. 6) Goal LDL<100.  7) Please monitor your blood pressure and keep a log to bring in with you at each visit. 8) Discussed risk factors with patient such as CAD, FAST of stroke symptoms, pt verbalizes understanding.   9) Please avoid smoking , alcohol and any illicit drug use if applicable to you. 10) Discussed lifestyle modifications ,dietary control and BP monitoring at home as patient does not wish to begin an antihypertension agent at present.      Flu :  - resolved , patient feels better

## 2018-03-12 NOTE — PROGRESS NOTES
Hospital f/u. ..1. Have you been to the ER, urgent care clinic since your last visit? Hospitalized since your last visit? Yes, [facility]    2. Have you seen or consulted any other health care providers outside of the [facility] since your last visit? Include any pap smears or colon screening.  no

## 2018-03-15 RX ORDER — INSULIN PUMP SYRINGE, 3 ML
EACH MISCELLANEOUS
Qty: 1 KIT | Refills: 0 | Status: SHIPPED | OUTPATIENT
Start: 2018-03-15 | End: 2018-04-27

## 2018-03-15 NOTE — TELEPHONE ENCOUNTER
Pharmacy was unable to confirm receipt of rs for glucose meter. Please resend & state which kind is being prescribed.

## 2018-04-23 ENCOUNTER — TELEPHONE (OUTPATIENT)
Dept: FAMILY MEDICINE CLINIC | Age: 53
End: 2018-04-23

## 2018-04-23 DIAGNOSIS — E11.9 TYPE 2 DIABETES MELLITUS WITHOUT COMPLICATION, WITHOUT LONG-TERM CURRENT USE OF INSULIN (HCC): Primary | ICD-10-CM

## 2018-04-27 RX ORDER — INSULIN PUMP SYRINGE, 3 ML
EACH MISCELLANEOUS
Qty: 1 KIT | Refills: 0 | Status: SHIPPED | OUTPATIENT
Start: 2018-04-27 | End: 2018-05-04 | Stop reason: SDUPTHER

## 2018-05-04 ENCOUNTER — TELEPHONE (OUTPATIENT)
Dept: FAMILY MEDICINE CLINIC | Age: 53
End: 2018-05-04

## 2018-05-04 DIAGNOSIS — E11.21 TYPE 2 DIABETES MELLITUS WITH NEPHROPATHY (HCC): ICD-10-CM

## 2018-05-04 DIAGNOSIS — E11.21 TYPE 2 DIABETES MELLITUS WITH NEPHROPATHY (HCC): Primary | ICD-10-CM

## 2018-05-04 DIAGNOSIS — E11.9 TYPE 2 DIABETES MELLITUS WITHOUT COMPLICATION, WITHOUT LONG-TERM CURRENT USE OF INSULIN (HCC): ICD-10-CM

## 2018-05-04 RX ORDER — LANCETS
EACH MISCELLANEOUS
Qty: 200 EACH | Refills: 11 | Status: ON HOLD | OUTPATIENT
Start: 2018-05-04 | End: 2022-02-11 | Stop reason: CLARIF

## 2018-05-04 NOTE — TELEPHONE ENCOUNTER
Pharmacist requesting a refill Free style lite test strips. States receive rx for monitoring kit but does not include the test strips or  Lancets. States please send in rx with dx code on it for insurance purposes. States pt is completely out. Advised provider not inn the office today.  States is possible please have another provider complete rx due to pt completely out

## 2018-05-04 NOTE — TELEPHONE ENCOUNTER
Pharmacist states received rx for freestyle test strips. States wrong rx. States rx has to specifically state freestyle lite test strip. States please resend rx with dx code as pt is completely out.

## 2018-05-04 NOTE — TELEPHONE ENCOUNTER
Pharmacy called and stated pt will need a Rx for diabetic testing strips. Blood Glucose Meter has already been picked up.

## 2018-05-07 ENCOUNTER — HOSPITAL ENCOUNTER (OUTPATIENT)
Dept: LAB | Age: 53
Discharge: HOME | End: 2018-05-07
Payer: MEDICARE

## 2018-05-07 ENCOUNTER — OFFICE VISIT (OUTPATIENT)
Dept: FAMILY MEDICINE CLINIC | Age: 53
End: 2018-05-07

## 2018-05-07 DIAGNOSIS — Z79.4 TYPE 2 DIABETES MELLITUS WITH MICROALBUMINURIA, WITH LONG-TERM CURRENT USE OF INSULIN (HCC): ICD-10-CM

## 2018-05-07 DIAGNOSIS — E78.5 HYPERLIPIDEMIA, UNSPECIFIED HYPERLIPIDEMIA TYPE: ICD-10-CM

## 2018-05-07 DIAGNOSIS — I10 ESSENTIAL HYPERTENSION: ICD-10-CM

## 2018-05-07 DIAGNOSIS — R80.9 TYPE 2 DIABETES MELLITUS WITH MICROALBUMINURIA, WITH LONG-TERM CURRENT USE OF INSULIN (HCC): ICD-10-CM

## 2018-05-07 DIAGNOSIS — E11.29 TYPE 2 DIABETES MELLITUS WITH MICROALBUMINURIA, WITH LONG-TERM CURRENT USE OF INSULIN (HCC): ICD-10-CM

## 2018-05-07 PROBLEM — E66.01 SEVERE OBESITY (BMI 35.0-39.9) WITH COMORBIDITY (HCC): Status: ACTIVE | Noted: 2018-05-07

## 2018-05-07 LAB
ALBUMIN SERPL-MCNC: 3.9 G/DL (ref 3.4–5)
ALBUMIN/GLOB SERPL: 1.1 {RATIO} (ref 0.8–1.7)
ALP SERPL-CCNC: 80 U/L (ref 45–117)
ALT SERPL-CCNC: 18 U/L (ref 16–61)
ANION GAP SERPL CALC-SCNC: 8 MMOL/L (ref 3–18)
AST SERPL-CCNC: 16 U/L (ref 15–37)
BILIRUB SERPL-MCNC: 0.4 MG/DL (ref 0.2–1)
BUN SERPL-MCNC: 43 MG/DL (ref 7–18)
BUN/CREAT SERPL: 4 (ref 12–20)
CALCIUM SERPL-MCNC: 9.7 MG/DL (ref 8.5–10.1)
CHLORIDE SERPL-SCNC: 99 MMOL/L (ref 100–108)
CHOLEST SERPL-MCNC: 120 MG/DL
CO2 SERPL-SCNC: 33 MMOL/L (ref 21–32)
CREAT SERPL-MCNC: 10.7 MG/DL (ref 0.6–1.3)
EST. AVERAGE GLUCOSE BLD GHB EST-MCNC: 177 MG/DL
GLOBULIN SER CALC-MCNC: 3.6 G/DL (ref 2–4)
GLUCOSE SERPL-MCNC: 152 MG/DL (ref 74–99)
HBA1C MFR BLD: 7.8 % (ref 4.2–5.6)
HDLC SERPL-MCNC: 35 MG/DL (ref 40–60)
HDLC SERPL: 3.4 {RATIO} (ref 0–5)
LDLC SERPL CALC-MCNC: 65.4 MG/DL (ref 0–100)
LIPID PROFILE,FLP: ABNORMAL
POTASSIUM SERPL-SCNC: 4.5 MMOL/L (ref 3.5–5.5)
PROT SERPL-MCNC: 7.5 G/DL (ref 6.4–8.2)
SODIUM SERPL-SCNC: 140 MMOL/L (ref 136–145)
TRIGL SERPL-MCNC: 98 MG/DL (ref ?–150)
VLDLC SERPL CALC-MCNC: 19.6 MG/DL

## 2018-05-07 PROCEDURE — 36415 COLL VENOUS BLD VENIPUNCTURE: CPT | Performed by: FAMILY MEDICINE

## 2018-05-07 PROCEDURE — 82043 UR ALBUMIN QUANTITATIVE: CPT | Performed by: FAMILY MEDICINE

## 2018-05-07 PROCEDURE — 80053 COMPREHEN METABOLIC PANEL: CPT | Performed by: FAMILY MEDICINE

## 2018-05-07 PROCEDURE — 80061 LIPID PANEL: CPT | Performed by: FAMILY MEDICINE

## 2018-05-07 PROCEDURE — 83036 HEMOGLOBIN GLYCOSYLATED A1C: CPT | Performed by: FAMILY MEDICINE

## 2018-05-07 RX ORDER — BLOOD-GLUCOSE METER
KIT MISCELLANEOUS
Qty: 1 KIT | Refills: 0 | Status: ON HOLD | OUTPATIENT
Start: 2018-05-07 | End: 2022-02-11 | Stop reason: CLARIF

## 2018-05-07 NOTE — PROGRESS NOTES
Patient is here for 6 month follow up for dm and htn. 1. Have you been to the ER, urgent care clinic since your last visit? Hospitalized since your last visit?no    2. Have you seen or consulted any other health care providers outside of the [facility] since your last visit? Include any pap smears or colon screening.  no

## 2018-05-08 LAB
CREAT UR-MCNC: 248.46 MG/DL (ref 30–125)
MICROALBUMIN UR-MCNC: 154.1 MG/DL (ref 0–3)
MICROALBUMIN/CREAT UR-RTO: 620 MG/G (ref 0–30)

## 2018-09-07 ENCOUNTER — TELEPHONE (OUTPATIENT)
Dept: FAMILY MEDICINE CLINIC | Age: 53
End: 2018-09-07

## 2018-09-07 NOTE — TELEPHONE ENCOUNTER
Received a fax transmission from [address] [place] [address]. Sent to be signed, sent, and scanned.

## 2018-09-29 ENCOUNTER — TELEPHONE (OUTPATIENT)
Dept: FAMILY MEDICINE CLINIC | Age: 53
End: 2018-09-29

## 2018-10-04 ENCOUNTER — TELEPHONE (OUTPATIENT)
Dept: FAMILY MEDICINE CLINIC | Age: 53
End: 2018-10-04

## 2018-10-04 NOTE — TELEPHONE ENCOUNTER
Received fax transmission of [phone],[facility] Medicine Progress Note. Sent to be signed, sent, and scanned.

## 2018-11-02 ENCOUNTER — TELEPHONE (OUTPATIENT)
Dept: FAMILY MEDICINE CLINIC | Age: 53
End: 2018-11-02

## 2018-11-12 ENCOUNTER — HOSPITAL ENCOUNTER (OUTPATIENT)
Dept: LAB | Age: 53
Discharge: HOME | End: 2018-11-12
Payer: MEDICARE

## 2018-11-12 ENCOUNTER — OFFICE VISIT (OUTPATIENT)
Dept: FAMILY MEDICINE CLINIC | Age: 53
End: 2018-11-12

## 2018-11-12 VITALS
DIASTOLIC BLOOD PRESSURE: 72 MMHG | SYSTOLIC BLOOD PRESSURE: 154 MMHG | BODY MASS INDEX: 39.4 KG/M2 | WEIGHT: 266 LBS | OXYGEN SATURATION: 96 % | HEIGHT: 69 IN | HEART RATE: 79 BPM | TEMPERATURE: 98.3 F | RESPIRATION RATE: 18 BRPM

## 2018-11-12 DIAGNOSIS — E66.01 SEVERE OBESITY (BMI 35.0-39.9) WITH COMORBIDITY (HCC): ICD-10-CM

## 2018-11-12 DIAGNOSIS — E11.9 ENCOUNTER FOR DIABETIC FOOT EXAM (HCC): ICD-10-CM

## 2018-11-12 DIAGNOSIS — E11.29 TYPE 2 DIABETES MELLITUS WITH MICROALBUMINURIA, WITH LONG-TERM CURRENT USE OF INSULIN (HCC): ICD-10-CM

## 2018-11-12 DIAGNOSIS — I50.32 DIASTOLIC CHF, CHRONIC (HCC): ICD-10-CM

## 2018-11-12 DIAGNOSIS — R80.9 TYPE 2 DIABETES MELLITUS WITH MICROALBUMINURIA, WITH LONG-TERM CURRENT USE OF INSULIN (HCC): Primary | ICD-10-CM

## 2018-11-12 DIAGNOSIS — L02.414 ABSCESS OF ARM, LEFT: ICD-10-CM

## 2018-11-12 DIAGNOSIS — Z12.12 SCREENING FOR COLORECTAL CANCER: ICD-10-CM

## 2018-11-12 DIAGNOSIS — E11.29 TYPE 2 DIABETES MELLITUS WITH MICROALBUMINURIA, WITH LONG-TERM CURRENT USE OF INSULIN (HCC): Primary | ICD-10-CM

## 2018-11-12 DIAGNOSIS — R80.9 TYPE 2 DIABETES MELLITUS WITH MICROALBUMINURIA, WITH LONG-TERM CURRENT USE OF INSULIN (HCC): ICD-10-CM

## 2018-11-12 DIAGNOSIS — Z79.4 TYPE 2 DIABETES MELLITUS WITH MICROALBUMINURIA, WITH LONG-TERM CURRENT USE OF INSULIN (HCC): Primary | ICD-10-CM

## 2018-11-12 DIAGNOSIS — Z79.4 TYPE 2 DIABETES MELLITUS WITH MICROALBUMINURIA, WITH LONG-TERM CURRENT USE OF INSULIN (HCC): ICD-10-CM

## 2018-11-12 DIAGNOSIS — Z12.11 SCREENING FOR COLORECTAL CANCER: ICD-10-CM

## 2018-11-12 DIAGNOSIS — I10 ESSENTIAL HYPERTENSION: ICD-10-CM

## 2018-11-12 DIAGNOSIS — I77.0 A-V FISTULA (HCC): ICD-10-CM

## 2018-11-12 DIAGNOSIS — Z00.00 MEDICARE ANNUAL WELLNESS VISIT, SUBSEQUENT: ICD-10-CM

## 2018-11-12 LAB
ALBUMIN SERPL-MCNC: 4.3 G/DL (ref 3.4–5)
ALBUMIN/GLOB SERPL: 1.4 {RATIO} (ref 0.8–1.7)
ALP SERPL-CCNC: 95 U/L (ref 45–117)
ALT SERPL-CCNC: 22 U/L (ref 16–61)
ANION GAP SERPL CALC-SCNC: 11 MMOL/L (ref 3–18)
AST SERPL-CCNC: 15 U/L (ref 15–37)
BILIRUB SERPL-MCNC: 0.3 MG/DL (ref 0.2–1)
BUN SERPL-MCNC: 63 MG/DL (ref 7–18)
BUN/CREAT SERPL: 5 (ref 12–20)
CALCIUM SERPL-MCNC: 9.8 MG/DL (ref 8.5–10.1)
CHLORIDE SERPL-SCNC: 100 MMOL/L (ref 100–108)
CO2 SERPL-SCNC: 31 MMOL/L (ref 21–32)
CREAT SERPL-MCNC: 12.1 MG/DL (ref 0.6–1.3)
EST. AVERAGE GLUCOSE BLD GHB EST-MCNC: 171 MG/DL
GLOBULIN SER CALC-MCNC: 3 G/DL (ref 2–4)
GLUCOSE SERPL-MCNC: 206 MG/DL (ref 74–99)
HBA1C MFR BLD: 7.6 % (ref 4.2–5.6)
POTASSIUM SERPL-SCNC: 4.5 MMOL/L (ref 3.5–5.5)
PROT SERPL-MCNC: 7.3 G/DL (ref 6.4–8.2)
SODIUM SERPL-SCNC: 142 MMOL/L (ref 136–145)

## 2018-11-12 PROCEDURE — 83036 HEMOGLOBIN GLYCOSYLATED A1C: CPT

## 2018-11-12 PROCEDURE — 80053 COMPREHEN METABOLIC PANEL: CPT

## 2018-11-12 NOTE — PROGRESS NOTES
Chief Complaint   Patient presents with    Hypertension    Diabetes    Annual Wellness Visit     1. Have you been to the ER, urgent care clinic since your last visit? Hospitalized since your last visit? No    2. Have you seen or consulted any other health care providers outside of the [facility] since your last visit? Include any pap smears or colon screening.  No

## 2018-11-13 ENCOUNTER — TELEPHONE (OUTPATIENT)
Dept: FAMILY MEDICINE CLINIC | Age: 53
End: 2018-11-13

## 2018-11-13 NOTE — PATIENT INSTRUCTIONS
Medicare Wellness Visit, Male    The best way to live healthy is to have a lifestyle where you eat a well-balanced diet, exercise regularly, limit alcohol use, and quit all forms of tobacco/nicotine, if applicable. Regular preventive services are another way to keep healthy. Preventive services (vaccines, screening tests, monitoring & exams) can help personalize your care plan, which helps you manage your own care. Screening tests can find health problems at the earliest stages, when they are easiest to treat. [facility] follows the current, evidence-based guidelines published by the [facility] Marian ([facility]) when recommending preventive services for our patients. Because we follow these guidelines, sometimes recommendations change over time as research supports it. (For example, a prostate screening blood test is no longer routinely recommended for men with no symptoms.)  Of course, you and your doctor may decide to screen more often for some diseases, based on your risk and co-morbidities (chronic disease you are already diagnosed with). Preventive services for you include:  - Medicare offers their members a free annual wellness visit, which is time for you and your primary care provider to discuss and plan for your preventive service needs. Take advantage of this benefit every year!  -All adults over age 72 should receive the recommended pneumonia vaccines. Current USPSTF guidelines recommend a series of two vaccines for the best pneumonia protection.   -All adults should have a flu vaccine yearly and an ECG.  All adults age 61 and older should receive a shingles vaccine once in their lifetime.    -All adults age 38-68 who are overweight should have a diabetes screening test once every three years.   -Other screening tests & preventive services for persons with diabetes include: an eye exam to screen for diabetic retinopathy, a kidney function test, a foot exam, and stricter control over your cholesterol.   -Cardiovascular screening for adults with routine risk involves an electrocardiogram (ECG) at intervals determined by the provider.   -Colorectal cancer screening should be done for adults age 54-65 with no increased risk factors for colorectal cancer. There are a number of acceptable methods of screening for this type of cancer. Each test has its own benefits and drawbacks. Discuss with your provider what is most appropriate for you during your annual wellness visit. The different tests include: colonoscopy (considered the best screening method), a fecal occult blood test, a fecal DNA test, and sigmoidoscopy.  -All adults born between [facility] should be screened once for Hepatitis C.  -An Abdominal Aortic Aneurysm (AAA) Screening is recommended for men age 73-68 who has ever smoked in their lifetime.      Here is a list of your current Health Maintenance items (your personalized list of preventive services) with a due date:  Health Maintenance Due   Topic Date Due    Stool testing FIT-DNA  08/29/2017    Shingles Vaccine (1 of 2) 08/29/2017

## 2018-11-13 NOTE — PROGRESS NOTES
HISTORY OF PRESENT ILLNESS  Geronimo Matute is a 48 y.o. male. Patient is here for his follow up and annual wellness exam. He is due to have the cologuard testing and I have discussed this with him, an order has been placed. Patient is also due to have some blood work to follow up on diabetes. He continues with dialysis  Three times a week and sees a kidney specialist.  Patient does mentions today that on his left arm there are two abscesses which have developed around his av fistula. The swellings are warm , tender and purple in color. I did speak to a  Nurse at his dialysis center who said patient will get set up with an apt for vascular surgeon. I have also explained to patient that this can be thrombosis as he continues to get Treaitment through this area. Hypertension    The history is provided by the patient. This is a chronic problem. The problem has been gradually improving. Associated symptoms include anxiety. Pertinent negatives include no chest pain, no palpitations, no confusion, no malaise/fatigue, no blurred vision, no headaches, no tinnitus, no peripheral edema, no dizziness, no shortness of breath, no nausea and no vomiting. Risk factors include hypertension, stress, diabetes mellitus, obesity, a sedentary lifestyle and family history. Diabetes   The history is provided by the patient. This is a chronic problem. The problem occurs constantly. The problem has been gradually improving. Pertinent negatives include no chest pain, no headaches and no shortness of breath. The symptoms are aggravated by stress and eating. The symptoms are relieved by medications. Treatments tried: currently on insulin. The treatment provided moderate relief. Review of Systems   Constitutional: Negative for malaise/fatigue. HENT: Negative for tinnitus. Eyes: Negative for blurred vision. Respiratory: Negative for cough, sputum production, shortness of breath and wheezing.     Cardiovascular: Negative for chest pain, palpitations and leg swelling. Gastrointestinal: Negative for nausea and vomiting. Genitourinary: Negative for dysuria, frequency and hematuria. Musculoskeletal: Negative for joint pain and myalgias. Skin: Negative for itching and rash. Two abscess on the left arm, warm on touch and purple in color   Neurological: Negative for dizziness, tingling, speech change, focal weakness and headaches. Psychiatric/Behavioral: Negative for confusion and depression. The patient is not nervous/anxious and does not have insomnia. Physical Exam   Constitutional: He is oriented to person, place, and time. He appears well-developed and well-nourished. No distress. HENT:   Head: Normocephalic and atraumatic. Right Ear: External ear normal.   Left Ear: External ear normal.   Mouth/Throat: Oropharynx is clear and moist. No oropharyngeal exudate. Eyes: EOM are normal. Pupils are equal, round, and reactive to light. No scleral icterus. Neck: Normal range of motion. No thyromegaly present. Cardiovascular: Normal rate, regular rhythm and normal heart sounds. Pulmonary/Chest: Effort normal and breath sounds normal. No respiratory distress. He has no wheezes. Abdominal: Soft. Bowel sounds are normal. He exhibits no distension. There is no tenderness. Lymphadenopathy:     He has no cervical adenopathy. Neurological: He is alert and oriented to person, place, and time. Skin:        Two swellings noted to be purple in color and warm on touch   Psychiatric: He has a normal mood and affect. This is the Subsequent Medicare Annual Wellness Exam, performed 12 months or more after the Initial AWV or the last Subsequent AWV    I have reviewed the patient's medical history in detail and updated the computerized patient record.      History     Past Medical History:   Diagnosis Date    [facility]estive [facility] failure) [facility]     [facility]     [facility] patient ([facility]     ESRD (end stage renal disease) ([facility]     HTN (hypertension)     Kidney failure       Past Surgical History:   Procedure Laterality Date    HX OTHER SURGICAL      fistula, left arm     Current Outpatient Medications   Medication Sig Dispense Refill    FREESTYLE LITE METER monitoring kit USE   TO CHECK GLUCOSE TWICE DAILY 1 Kit 0    glucose blood VI test strips (FREESTYLE TEST) strip freestyle lite test strip Testing 4x/day 200 Strip 5    Lancets misc For glucose testing  Each 11    hydrALAZINE (APRESOLINE) 100 mg tablet Take 1 Tab by mouth daily. 90 Tab 0    insulin NPH/insulin regular (HUMULIN 70/30 U-100 INSULIN) 100 unit/mL (70-30) injection 30 Units by SubCUTAneous route two (2) times a day. 1 Vial 5    carvedilol (COREG) 6.25 mg tablet Take 1 Tab by mouth two (2) times daily (with meals). Indications: hypertension 60 Tab 0    aspirin 81 mg chewable tablet Take 1 Tab by mouth daily. 30 Tab 0    albuterol (PROVENTIL HFA) 90 mcg/actuation inhaler Take 2 Puffs by inhalation every four (4) hours as needed for Wheezing. 1 Inhaler 3    calcium acetate (PHOSLO) 667 mg cap Take 1 Cap by mouth three (3) times daily (with meals). (Patient taking differently: Take 3 Caps by mouth three (3) times daily (with meals). PATIENT TAKES FOUR CAPSULES BY MOUTH WITH MEALS AND TWO CAPSULES DAILY WITH SNACKS. Indications: Renal Osteodystrophy with Hyperphosphatemia) 90 Cap 0    ammonium lactate (LAC-HYDRIN) 12 % topical cream Apply  to affected area two (2) times a day. rub in to affected area well 280 g 0    simvastatin (ZOCOR) 10 mg tablet Take  by mouth nightly.  calcipotriene-betamethasone (TACLONEX) 0.005-0.064 % topical ointment Apply  to affected area daily. 60 g 0    B COMPLEX & C NO.20/FOLIC ACID (RENAL SOFTGELS PO) Take  by mouth daily.        No Known Allergies  Family History   Problem Relation Age of Onset    Stroke Mother     Cancer Father         colon cancer     Social History     Tobacco Use    Smoking status: Current Some Day Smoker    Smokeless tobacco: Never Used   Substance Use Topics    Alcohol use: Yes     Comment: occa     Patient Active Problem List   Diagnosis Code    Type 2 diabetes mellitus without complication (HCC) S97.8    Hyperlipidemia E78.5    Essential hypertension I10    CKD (chronic kidney disease) requiring chronic dialysis (HCC) N18.6, S30.1    Diastolic CHF, chronic (HCC) I50.32    Advance care planning Z71.89    Tubular adenoma of colon D12.6    Chest pain R07.9    Type 2 diabetes mellitus with nephropathy (HCC) E11.21    DM (diabetes mellitus) ([facility] E11.9    ESRD on dialysis ([facility] N18.6, Z99.2    Severe obesity (BMI 35.0-39. 9) with comorbidity ([facility] E66.01       Depression Risk Factor Screening:     PHQ over the last two weeks 5/7/2018   Little interest or pleasure in doing things Not at all   Feeling down, depressed, irritable, or hopeless Not at all   Total Score PHQ 2 0     Alcohol Risk Factor Screening:   Patient does not drink     Functional Ability and Level of Safety:   Hearing Loss  Good hearing on whisper testing , 2 feel behind the patient. Activities of Daily Living  The home contains: no safety equipment. Patient does total self care    Fall Risk  No flowsheet data found.     Abuse Screen  Patient is not abused    Cognitive Screening   Evaluation of Cognitive Function:  Has your family/caregiver stated any concerns about your memory: no  Normal    Patient Care Team   Patient Care Team:  Rimma Fernández MD as PCP - General (Internal Medicine)  Gal Santamaria RN as Ambulatory Care Navigator    Assessment/Plan   Education and counseling provided:  Are appropriate based on today's review and evaluation  End-of-Life planning (with patient's consent)  Pneumococcal Vaccine  Influenza Vaccine  Hepatitis B Vaccine  Prostate cancer screening tests (PSA, covered annually)  Colorectal cancer screening tests  Cardiovascular screening blood test  Bone mass measurement (DEXA)  Screening for glaucoma    Diagnoses and all orders for this visit:    1. Type 2 diabetes mellitus with microalbuminuria, with long-term current use of insulin (HCC)  -     METABOLIC PANEL, COMPREHENSIVE; Future  -     HEMOGLOBIN A1C WITH EAG; Future    2. Medicare annual wellness visit, subsequent    3. Essential hypertension  -     METABOLIC PANEL, COMPREHENSIVE; Future  -     HEMOGLOBIN A1C WITH EAG; Future    4. Diastolic CHF, chronic ([facility]    5. Severe obesity (BMI 35.0-39. 9) with comorbidity ([facility]    6. Encounter for diabetic foot exam ([facility]  -      DIABETES FOOT EXAM    7. Abscess of arm, left    8. [facility] fistula ([facility]    9. Screening for colorectal cancer  -     COLOGUARD TEST (FECAL DNA COLORECTAL CANCER SCREENING)        Health Maintenance Due   Topic Date Due    Cologuard Q 3 Years  08/29/2017    Shingrix Vaccine Age 50> (1 of 2) 08/29/2017     Subsequent medicare wellness:  1) Please make sure you have a routine physical exam every 1-2 years. 2) Annual check up with eye doctor and dentist.  3) Colorectal cancer screening with colonoscopy every ten years at age 48. Depending on certain risk factors screening may need to be done earlier. 4) Rectal exam and PSA screening at age of 48, screening may be done earlier depending on certain risk factors as discussed.    ) Bone density testing starting at the age of 72.   ) Routine blood work to be ordered as part of physical exam and has been discussed with patient.  ) Screening for STD's/HIV.  ) Exercise at least 30 min 3-5 times a week for goal BMI of less than or equal to 25.    Please make sure you wear a seat belt while driving daily , helmet safety discussed.  ) Please avoid smoking , alcohol and illicit drug use.  ) Daily requirement of calcium is 1200 mg per day and 1000 IU of vitamin D.  ) Please make sure all immunizations are up to date:       - Influenza vaccine every year        - Tdap every 10 years       - Pneumococcal vaccine starting at age of 72       - Shingles at age 61     Diabetes :  1)   Goal HBA1C < 7.  2)   Post prandial blood sugar less than or equal to 180.  3)   Exercise 30 min 3-5 times a week for goal BMI of 25.  4)   Please monitor blood sugars as directed and keep a log to bring in with you at each visit. 5)   Diabetic diet discussed and patient instructions to be handed out. 6)   Goal LDL< 100  7)   Goal BP< 120/80 mmhg. 8)   Annual eye exam and foot exam.  9)   Please examine you feet daily for any skin breakages or ulcers. 10) Referral made to see nutritionist for better dietary guidance. 11) Continue current medications as prescribed. 12) Explained the side effects of medication and patient verbalized understanding. 13) Please avoid smoking , alcohol and illicit drug use if applicable to you.  14) Please have blood work ordered today completed. 15) Please make sure you schedule your routine medical exam every 1-2 years. Hypertension :  1) Goal blood pressure less than equal to 140/90 mmHg, goal BP can vary depending on risk factors as discussed. 2) Lifestyle modifications discussed with patient, low sodium <2 gm, low salt , DASH diet  3) Exercise for at least 30 min 3-5 times a week for goal BMI of less than or equal  To 25.  4) Continue current medications as prescribed. 5) Please begin medication as discussed for better blood pressure control, explained side effects and patient verbalized understanding. 6) Goal LDL<100.  7) Please monitor your blood pressure and keep a log to bring in with you at each visit. 8) Discussed risk factors with patient such as CAD, FAST of stroke symptoms, pt verbalizes understanding. 9) Please avoid smoking , alcohol and any illicit drug use if applicable to you.     Obesity :  - Lifestyle modification is necessary to ensure weight loss and this includes diet , exercise and behavioral modification.  - Diet is geared towards balanced low-calorie diets/portion-controlled diets, low fat diet , low carbohydrate diet and mediterranean diet. Start with 1500 kcal diet. -Exercise 30 minutes 3-5 times a week   - Behavioral modification helps in controlling eating behavior    A-V fistula swelling :  -  Dialysis center  Consulted and referral to be made to specialist on their part.

## 2019-01-23 PROBLEM — M86.9 OSTEOMYELITIS (HCC): Status: ACTIVE | Noted: 2019-01-23

## 2019-01-29 PROBLEM — M86.9 OSTEOMYELITIS (HCC): Status: ACTIVE | Noted: 2019-01-29

## 2019-02-07 DIAGNOSIS — Z79.4 TYPE 2 DIABETES MELLITUS WITHOUT COMPLICATION, WITH LONG-TERM CURRENT USE OF INSULIN (HCC): ICD-10-CM

## 2019-02-07 DIAGNOSIS — E11.9 TYPE 2 DIABETES MELLITUS WITHOUT COMPLICATION, WITH LONG-TERM CURRENT USE OF INSULIN (HCC): ICD-10-CM

## 2019-02-07 NOTE — TELEPHONE ENCOUNTER
Requested Prescriptions     Pending Prescriptions Disp Refills    insulin NPH/insulin regular (HUMULIN 70/30 U-100 INSULIN) 100 unit/mL (70-30) injection 1 Vial 5     Si Units by SubCUTAneous route two (2) times a day.

## 2019-02-12 PROBLEM — L03.114 CELLULITIS OF LEFT HAND: Status: ACTIVE | Noted: 2019-02-12

## 2019-02-25 ENCOUNTER — TELEPHONE (OUTPATIENT)
Dept: FAMILY MEDICINE CLINIC | Age: 54
End: 2019-02-25

## 2019-02-25 NOTE — TELEPHONE ENCOUNTER
Osei Faith with [facility] states pt was transported to the [facility] ER by EMS due to low blood sugar, Dizziness, and nausea.

## 2019-03-06 ENCOUNTER — OFFICE VISIT (OUTPATIENT)
Dept: FAMILY MEDICINE CLINIC | Age: 54
End: 2019-03-06

## 2019-03-06 VITALS
TEMPERATURE: 98.9 F | WEIGHT: 264.2 LBS | RESPIRATION RATE: 16 BRPM | OXYGEN SATURATION: 95 % | BODY MASS INDEX: 39.13 KG/M2 | HEIGHT: 69 IN | DIASTOLIC BLOOD PRESSURE: 84 MMHG | HEART RATE: 80 BPM | SYSTOLIC BLOOD PRESSURE: 198 MMHG

## 2019-03-06 DIAGNOSIS — Z79.4 TYPE 2 DIABETES MELLITUS WITH MICROALBUMINURIA, WITH LONG-TERM CURRENT USE OF INSULIN (HCC): ICD-10-CM

## 2019-03-06 DIAGNOSIS — I50.32 DIASTOLIC CHF, CHRONIC (HCC): ICD-10-CM

## 2019-03-06 DIAGNOSIS — Z12.11 SCREENING FOR COLORECTAL CANCER: ICD-10-CM

## 2019-03-06 DIAGNOSIS — I77.0 A-V FISTULA (HCC): ICD-10-CM

## 2019-03-06 DIAGNOSIS — E87.8 ELECTROLYTE IMBALANCE: ICD-10-CM

## 2019-03-06 DIAGNOSIS — Z09 HOSPITAL DISCHARGE FOLLOW-UP: Primary | ICD-10-CM

## 2019-03-06 DIAGNOSIS — N18.9 HISTORY OF ANEMIA DUE TO CKD: ICD-10-CM

## 2019-03-06 DIAGNOSIS — L03.114 LEFT ARM CELLULITIS: ICD-10-CM

## 2019-03-06 DIAGNOSIS — M86.9 OSTEOMYELITIS OF RIGHT FOOT, UNSPECIFIED TYPE (HCC): ICD-10-CM

## 2019-03-06 DIAGNOSIS — Z86.2 HISTORY OF ANEMIA DUE TO CKD: ICD-10-CM

## 2019-03-06 DIAGNOSIS — N18.6 CKD (CHRONIC KIDNEY DISEASE) REQUIRING CHRONIC DIALYSIS (HCC): ICD-10-CM

## 2019-03-06 DIAGNOSIS — E66.01 SEVERE OBESITY (BMI 35.0-39.9) WITH COMORBIDITY (HCC): ICD-10-CM

## 2019-03-06 DIAGNOSIS — Z99.2 CKD (CHRONIC KIDNEY DISEASE) REQUIRING CHRONIC DIALYSIS (HCC): ICD-10-CM

## 2019-03-06 DIAGNOSIS — E87.5 HYPERKALEMIA: ICD-10-CM

## 2019-03-06 DIAGNOSIS — L03.114 CELLULITIS OF LEFT HAND: ICD-10-CM

## 2019-03-06 DIAGNOSIS — S81.801D WOUND OF RIGHT LOWER EXTREMITY, SUBSEQUENT ENCOUNTER: ICD-10-CM

## 2019-03-06 DIAGNOSIS — E11.29 TYPE 2 DIABETES MELLITUS WITH MICROALBUMINURIA, WITH LONG-TERM CURRENT USE OF INSULIN (HCC): ICD-10-CM

## 2019-03-06 DIAGNOSIS — Z12.12 SCREENING FOR COLORECTAL CANCER: ICD-10-CM

## 2019-03-06 DIAGNOSIS — R80.9 TYPE 2 DIABETES MELLITUS WITH MICROALBUMINURIA, WITH LONG-TERM CURRENT USE OF INSULIN (HCC): ICD-10-CM

## 2019-03-06 NOTE — PROGRESS NOTES
HISTORY OF PRESENT ILLNESS  Balwinder Mejia is a 48 y.o. male. 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 He was given iv abx vancomycin and zosyn and  Was s/p fifth digit amputation from admission in january. He was seen by a wound care in hospital. He continues to get dialysis and he will be seeing a ID doctor today. On 2/12/19 he had a procedure done wit Dr. Fiorella Gomez for drainage of fluid of the left hand abscess. Patient is here today and he is currently on oral vancomycin which will stop on 3/10,  but his left arm continues to be swollen and warm to touch. He does have some pain but mentions the swelling has started to come down. He also mentions his diarrhea / nausea and vmiting have subsided. Currentlyhe receives home health care with physical and occupational therapy. DVT was also ruled out. His blood sugars at home have been in the 110 to 180 and the highest 236. He has been taking his insulin and eating better. I have reviewed blood sugar readings today. He has also been taking his medications for hypertension and CHF. He continues to have AV fistula which was addressed at the last visit and his dialysis center was working on patient seeing vascular specialist. Patient mentions that no one has called him in regards to this and the referral was to be placed yesterday. He was instructed to wait one day as today they are closed today and will be contacted tomorrow. I have also ordered blood work for Friday and he will come back then for a repeat metabolic panel. Patient also mentions a few days back he was scratching his right lower leg and peeled some skin off. I have discussed wound care and he has been given ointment and gauze to do dressing.   I have also advised him to discuss the wound with infectious disease today.        Hospital Follow Up   The history is provided by the patient. This is a new problem. The problem occurs constantly. The problem has been gradually improving. Pertinent negatives include no chest pain, no abdominal pain, no headaches and no shortness of breath. The symptoms are aggravated by stress. The symptoms are relieved by medications. Review of Systems   Constitutional: Negative for chills, fever and malaise/fatigue. HENT: Negative for congestion, ear discharge, ear pain, hearing loss, sinus pain and sore throat. Eyes: Negative for blurred vision, double vision, pain and discharge. Respiratory: Negative for cough, sputum production, shortness of breath and wheezing. Cardiovascular: Positive for leg swelling. Negative for chest pain and palpitations. Left arm swelling / hand swelling   Gastrointestinal: Negative for abdominal pain, blood in stool, constipation, diarrhea, nausea and vomiting. Genitourinary: Negative for dysuria, flank pain, hematuria and urgency. Musculoskeletal: Positive for joint pain. Negative for back pain and myalgias. Skin: Positive for itching and rash. Rash on the right lower leg   Neurological: Negative for focal weakness, weakness and headaches. Psychiatric/Behavioral: Negative for depression. The patient is not nervous/anxious and does not have insomnia. Visit Vitals  /84   Pulse 80   Temp 98.9 °F (37.2 °C) (Oral)   Resp 16   Ht 5' 9\" (1.753 m)   Wt 264 lb 3.2 oz (119.8 kg)   SpO2 95%   BMI 39.02 kg/m²       Physical Exam   Constitutional: He is oriented to person, place, and time. He appears well-developed and well-nourished. No distress. HENT:   Head: Normocephalic and atraumatic. Right Ear: External ear normal.   Left Ear: External ear normal.   Mouth/Throat: Oropharynx is clear and moist.   Eyes: EOM are normal. Pupils are equal, round, and reactive to light. No scleral icterus. Neck: Normal range of motion.  No thyromegaly present. Cardiovascular: Normal rate, regular rhythm and normal heart sounds. Pulmonary/Chest: Effort normal and breath sounds normal. No respiratory distress. He has no wheezes. Abdominal: Soft. Bowel sounds are normal. He exhibits no distension. There is no tenderness. Lymphadenopathy:     He has no cervical adenopathy. Neurological: He is alert and oriented to person, place, and time. Skin:        Ongoing left arm AV fistula / lower right leg swelling / left arm and hand cellulitis getting better   Psychiatric: He has a normal mood and affect. ASSESSMENT and PLAN  Left arm and hand cellulitis ;  - Getting better at present and pain is better controlled   - Please continue oral vancomycin at present  - Patient has apt with ID today and I have advised patient to discuss need for wound care  - culture wound from 2/9 has been reviewed  And blood cultures were negative  -  Explained patient to keep hand elevated  - currently under going PT/OT  - PATIENT HAS PENDING VASCULAR REFERRAL THAT WAS MADE BY HIS KIDNEY SPECIALIST AND HAS  NOT YET BEEN CONTACTED. 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  2)   Post prandial blood sugar less than or equal to 180.  3)   Exercise 30 min 3-5 times a week for goal BMI of 25.  4)   Please monitor blood sugars as directed and keep a log to bring in with you at each visit. 5)   Diabetic diet discussed and patient instructions to be handed out. 6)   Goal LDL< 100  7)   Goal BP< 120/80 mmhg. 8)   Annual eye exam and foot exam.  9)   Please examine you feet daily for any skin breakages or ulcers.   10) Referral made to see nutritionist for better dietary guidance. 11) Continue current medications as prescribed. 12) Explained the side effects of medication and patient verbalized understanding. 13) Please avoid smoking , alcohol and illicit drug use if applicable to you.  14) Please have blood work ordered today completed. 15) Please make sure you schedule your routine medical exam every 1-2 years. Hypertension ;  1) Goal blood pressure less than equal to 140/90 mmHg, goal BP can vary depending on risk factors as discussed. 2) Lifestyle modifications discussed with patient, low sodium <2 gm, low salt , DASH diet  3) Exercise for at least 30 min 3-5 times a week for goal BMI of less than or equal  To 25.  4) Continue current medications as prescribed. 5) Please begin medication as discussed for better blood pressure control, explained side effects and patient verbalized understanding. 6) Goal LDL<100.  7) Please monitor your blood pressure and keep a log to bring in with you at each visit. 8) Discussed risk factors with patient such as CAD, FAST of stroke symptoms, pt verbalizes understanding. 9) Please avoid smoking , alcohol and any illicit drug use if applicable to you. 10) Discussed lifestyle modifications ,dietary control and BP monitoring at home     CKD on dialysis :  - Stable and receiving treatment    CHF:  - Coronary artery disease risk factors discussed. - Please maintain routine exercise regimen , 30 minutes 3-5 times a week. - Cholesterol and blood pressure control  - Ok to take aspirin 81 mg daily  - Continue current medications and f/u with cardiology.     Discussed screening for colon cancer        Anemia due to CKD :  - stable at present

## 2019-03-08 ENCOUNTER — LAB ONLY (OUTPATIENT)
Dept: FAMILY MEDICINE CLINIC | Age: 54
End: 2019-03-08

## 2019-03-08 DIAGNOSIS — Z01.89 ROUTINE LAB DRAW: Primary | ICD-10-CM

## 2019-03-14 ENCOUNTER — TELEPHONE (OUTPATIENT)
Dept: FAMILY MEDICINE CLINIC | Age: 54
End: 2019-03-14

## 2019-03-19 RX ORDER — MAGNESIUM SULFATE 100 %
4 CRYSTALS MISCELLANEOUS AS NEEDED
Qty: 90 TAB | Refills: 0 | Status: ON HOLD | OUTPATIENT
Start: 2019-03-19 | End: 2022-02-11 | Stop reason: CLARIF

## 2019-03-25 ENCOUNTER — OFFICE VISIT (OUTPATIENT)
Dept: FAMILY MEDICINE CLINIC | Age: 54
End: 2019-03-25

## 2019-03-25 VITALS
TEMPERATURE: 97.9 F | OXYGEN SATURATION: 98 % | HEART RATE: 90 BPM | HEIGHT: 69 IN | SYSTOLIC BLOOD PRESSURE: 160 MMHG | RESPIRATION RATE: 20 BRPM | DIASTOLIC BLOOD PRESSURE: 80 MMHG | BODY MASS INDEX: 39.25 KG/M2 | WEIGHT: 265 LBS

## 2019-03-25 DIAGNOSIS — I77.0 A-V FISTULA (HCC): Primary | ICD-10-CM

## 2019-03-25 DIAGNOSIS — T82.838D: ICD-10-CM

## 2019-03-25 NOTE — PROGRESS NOTES
HISTORY OF PRESENT ILLNESS  Lana Gutierrez is a 48 y.o. male. Patient is here to follow up after recently seen at [facility] emergency after he bled from his av fistula . He bled after his dialysis session. On 3/22/19 he went to see a vascular specialist and he has another appointment  this Friday and patient mentions some procedure will be done but he is not sure what procedure. He mentions most likely it will be a angioplasty. dermabond has been applied to his bleeding av fistula  He also had cellulitis of  The left hand going up to the arm weeks prior. He completed his antibiotics and was seen by infectious disease last week and was told he will no longer need to be seen. He has a home care nurse coming three times a week doing wound care dressing. Patients swelling of the left hand and right leg is getting better and he is currently not on heavy diuretics due to being on dialysis. He is no longer on any antibiotics. Hospital Follow Up   Pertinent negatives include no abdominal pain, no headaches and no shortness of breath. Review of Systems   Constitutional: Negative for diaphoresis, fever and malaise/fatigue. HENT: Negative for congestion, hearing loss, nosebleeds, sinus pain and sore throat. Eyes: Negative for blurred vision, double vision, pain and discharge. Respiratory: Negative for cough, sputum production and shortness of breath. Cardiovascular: Positive for leg swelling. Gastrointestinal: Negative for abdominal pain, blood in stool, nausea and vomiting. Genitourinary: Negative for dysuria, frequency and hematuria. Musculoskeletal: Negative for joint pain and myalgias. Hand swelling getting better. Skin:        Av fistula with dermabond on top   Neurological: Negative for dizziness, tingling, focal weakness and headaches. Psychiatric/Behavioral: Negative for depression. The patient is nervous/anxious.         Physical Exam   Constitutional: He is oriented to person, place, and time. He appears well-developed and well-nourished. No distress. HENT:   Head: Normocephalic and atraumatic. Right Ear: External ear normal.   Left Ear: External ear normal.   Mouth/Throat: Oropharynx is clear and moist. No oropharyngeal exudate. Eyes: Pupils are equal, round, and reactive to light. EOM are normal. No scleral icterus. Neck: No thyromegaly present. Cardiovascular: Normal rate, regular rhythm and normal heart sounds. Pulmonary/Chest: Breath sounds normal. He is in respiratory distress. He has no wheezes. Abdominal: Soft. Bowel sounds are normal. He exhibits no distension. There is no tenderness. Lymphadenopathy:     He has no cervical adenopathy. Neurological: He is alert and oriented to person, place, and time. Skin:        Av fistula on the left arm    Psychiatric: He has a normal mood and affect.        ASSESSMENT and PLAN  Bleeding from dialysis sight :  - please follow up with vascular surgeon on Friday  - reviewed hospital record  - medication reconciliation reviewed      Cellulitis of the left hand and arm :  - Please monitor for worsening   - home care  - appears stable at present

## 2019-03-25 NOTE — PROGRESS NOTES
Chief Complaint   Patient presents with   [facility] Follow Up     toe amputated due to infection, pinky toe right foot, second hospital f/u staph infection in left hand     . .1. Have you been to the ER, urgent care clinic since your last visit? Hospitalized since your last visit? Yes Reason for visit: toe amputated and staph infection in arm    2. Have you seen or consulted any other health care providers outside of the [address] [place] [address] since your last visit? Include any pap smears or colon screening.  Yes Where: [address] [place] [address]

## 2019-07-22 ENCOUNTER — OFFICE VISIT (OUTPATIENT)
Dept: FAMILY MEDICINE CLINIC | Age: 54
End: 2019-07-22

## 2019-07-22 VITALS
DIASTOLIC BLOOD PRESSURE: 73 MMHG | TEMPERATURE: 98.7 F | WEIGHT: 263 LBS | HEIGHT: 69 IN | RESPIRATION RATE: 20 BRPM | SYSTOLIC BLOOD PRESSURE: 153 MMHG | HEART RATE: 76 BPM | BODY MASS INDEX: 38.95 KG/M2 | OXYGEN SATURATION: 96 %

## 2019-07-22 DIAGNOSIS — I10 ESSENTIAL HYPERTENSION: ICD-10-CM

## 2019-07-22 DIAGNOSIS — T82.838D: Primary | ICD-10-CM

## 2019-07-22 DIAGNOSIS — E78.5 HYPERLIPIDEMIA, UNSPECIFIED HYPERLIPIDEMIA TYPE: ICD-10-CM

## 2019-07-22 DIAGNOSIS — Z12.11 SCREENING FOR COLORECTAL CANCER: ICD-10-CM

## 2019-07-22 DIAGNOSIS — R80.9 TYPE 2 DIABETES MELLITUS WITH MICROALBUMINURIA, WITH LONG-TERM CURRENT USE OF INSULIN (HCC): ICD-10-CM

## 2019-07-22 DIAGNOSIS — I77.0 A-V FISTULA (HCC): ICD-10-CM

## 2019-07-22 DIAGNOSIS — Z99.2 CKD (CHRONIC KIDNEY DISEASE) REQUIRING CHRONIC DIALYSIS (HCC): ICD-10-CM

## 2019-07-22 DIAGNOSIS — Z12.12 SCREENING FOR COLORECTAL CANCER: ICD-10-CM

## 2019-07-22 DIAGNOSIS — Z79.4 TYPE 2 DIABETES MELLITUS WITH MICROALBUMINURIA, WITH LONG-TERM CURRENT USE OF INSULIN (HCC): ICD-10-CM

## 2019-07-22 DIAGNOSIS — N18.6 CKD (CHRONIC KIDNEY DISEASE) REQUIRING CHRONIC DIALYSIS (HCC): ICD-10-CM

## 2019-07-22 DIAGNOSIS — E11.29 TYPE 2 DIABETES MELLITUS WITH MICROALBUMINURIA, WITH LONG-TERM CURRENT USE OF INSULIN (HCC): ICD-10-CM

## 2019-07-22 RX ORDER — SIMVASTATIN 10 MG/1
10 TABLET, FILM COATED ORAL
Qty: 90 TAB | Refills: 3 | Status: SHIPPED | OUTPATIENT
Start: 2019-07-22

## 2019-07-22 RX ORDER — HYDRALAZINE HYDROCHLORIDE 100 MG/1
100 TABLET, FILM COATED ORAL 3 TIMES DAILY
COMMUNITY

## 2019-07-22 NOTE — PROGRESS NOTES
Chief Complaint   Patient presents with   [facility] Follow Up     Arm Pain - bleed from shunt - [facility]     . .1. Have you been to the ER, urgent care clinic since your last visit? Hospitalized since your last visit? Yes Where: Arm Pain - bleed from shunt [facility] Reason for visit: Arm Pain - bleed from shunt - [facility]    2. Have you seen or consulted any other health care providers outside of the [address] [place] [address] since your last visit? Include any pap smears or colon screening.  Yes Where: [facility]

## 2019-07-22 NOTE — PROGRESS NOTES
HISTORY OF PRESENT ILLNESS  Angus Prince is a 48 y.o. male. Patient is here for his diabetes follow up . He is due to have some blood work done. Currently he is using insulin humulin 70/30 30 units bid and his blood sugars range between 130 to 180. He is also on  A statin and blood pressure medications. He is also here to follow up on surgery for his av fistula. Patient mentions he did have his surgery done for AV fistula and he is feeling better. He has followed up with vascular specialist and he has recently changed nephrologist and his dialysis center. He is still undergoing dialysis three times a week. Diabetes   The history is provided by the patient. This is a chronic problem. The problem occurs constantly. The problem has been gradually improving. Pertinent negatives include no chest pain, no abdominal pain, no headaches and no shortness of breath. The symptoms are aggravated by stress. The symptoms are relieved by medications. Treatments tried: insulin  The treatment provided moderate relief. Follow Up Chronic Condition   The history is provided by the patient. This is a chronic problem. The problem occurs constantly. The problem has been gradually improving. Pertinent negatives include no chest pain, no abdominal pain, no headaches and no shortness of breath. The symptoms are aggravated by stress. Nothing relieves the symptoms. He has tried nothing for the symptoms. Review of Systems   Constitutional: Negative for chills, fever and malaise/fatigue. HENT: Negative for congestion, ear discharge, ear pain, hearing loss, sinus pain and sore throat. Eyes: Negative for blurred vision, double vision and discharge. Respiratory: Negative for cough, shortness of breath and wheezing. Cardiovascular: Negative for chest pain, palpitations and leg swelling. Gastrointestinal: Negative for abdominal pain, constipation, diarrhea, nausea and vomiting.    Genitourinary: Negative for dysuria and hematuria. Musculoskeletal: Negative for joint pain and myalgias. Skin:        Large blood clots of the right upper arm stable at present. Neurological: Negative for tingling, focal weakness, seizures, weakness and headaches. Endo/Heme/Allergies: Negative for environmental allergies. Psychiatric/Behavioral: Negative for depression. The patient is not nervous/anxious. Visit Vitals  /73   Pulse 76   Temp 98.7 °F (37.1 °C) (Oral)   Resp 20   Ht 5' 9\" (1.753 m)   Wt 263 lb (119.3 kg)   SpO2 96%   BMI 38.84 kg/m²       Physical Exam   Constitutional: He is oriented to person, place, and time. He appears well-developed and well-nourished. No distress. HENT:   Head: Normocephalic and atraumatic. Right Ear: External ear normal.   Left Ear: External ear normal.   Mouth/Throat: Oropharynx is clear and moist. No oropharyngeal exudate. Eyes: Pupils are equal, round, and reactive to light. EOM are normal. No scleral icterus. Neck: Normal range of motion. No thyromegaly present. Cardiovascular: Normal rate, regular rhythm and normal heart sounds. Pulmonary/Chest: Effort normal and breath sounds normal. No respiratory distress. He has no wheezes. Abdominal: Soft. Bowel sounds are normal. He exhibits no distension. There is no tenderness. Lymphadenopathy:     He has no cervical adenopathy. Neurological: He is alert and oriented to person, place, and time. Psychiatric: He has a normal mood and affect. ASSESSMENT and PLAN  Diabetes :  1)   Goal HBA1C < 7.  2)   Post prandial blood sugar less than or equal to 180.  3)   Exercise 30 min 3-5 times a week for goal BMI of 25.  4)   Please monitor blood sugars as directed and keep a log to bring in with you at each visit. 5)   Diabetic diet discussed and patient instructions to be handed out. 6)   Goal LDL< 100  7)   Goal BP< 120/80 mmhg.   8)   Annual eye exam and foot exam.  9)   Please examine you feet daily for any skin breakages or ulcers. 10) Referral made to see nutritionist for better dietary guidance. 11) Continue current medications as prescribed. 12) Explained the side effects of medication and patient verbalized understanding. 13) Please avoid smoking , alcohol and illicit drug use if applicable to you.  14) Please have blood work ordered today completed. 15) Please make sure you schedule your routine medical exam every 1-2 years. Hyperlipidemia :  1) Goal LDL less than or equal to 100 mg/dl , total cholesterol less than or equal to 200 mg/dl. 2) Goal blood pressure less than or equal to 140/90 mmHg. 3) Discussed low - cholesterol and low fat diet, Good Fats vs Bad Fats. 4) Exercise 30 min 3-5 times a week for goal BMI of less than or equal to 25.  5) Continue current medication as prescribed for cholesterol control. 6) Explained the side effects of medication and patient verbalized understanding. 7) You may also begin fish oil 1000 mg 3-4 times a day for better cholesterol control. 8) You may also try red yeast rice for cholesterol control. 9) Please drink skim milk if you drink dairy products. 10) Please avoid smoking , alcohol , or any illicit drug use if applicable to you. 11) Regular cholesterol panel to be done every 6-12 months.  12) Discussed attributing risk factors , patient verbalized understanding. Hypertension ;  1) Goal blood pressure less than equal to 140/90 mmHg, goal BP can vary depending on risk factors as discussed. 2) Lifestyle modifications discussed with patient, low sodium <2 gm, low salt , DASH diet  3) Exercise for at least 30 min 3-5 times a week for goal BMI of less than or equal  To 25.  4) Continue current medications as prescribed. 5) Please begin medication as discussed for better blood pressure control, explained side effects and patient verbalized understanding. 6) Goal LDL<100.  7) Please monitor your blood pressure and keep a log to bring in with you at each visit.   8) Discussed risk factors with patient such as CAD, FAST of stroke symptoms, pt verbalizes understanding. 9) Please avoid smoking , alcohol and any illicit drug use if applicable to you.   10) Discussed lifestyle modifications ,dietary control and BP monitoring at home     CKD Stage 3:  - please follow up with specialist and continue dialysis

## 2019-10-03 DIAGNOSIS — Z79.4 TYPE 2 DIABETES MELLITUS WITHOUT COMPLICATION, WITH LONG-TERM CURRENT USE OF INSULIN (HCC): ICD-10-CM

## 2019-10-03 DIAGNOSIS — E11.9 TYPE 2 DIABETES MELLITUS WITHOUT COMPLICATION, WITH LONG-TERM CURRENT USE OF INSULIN (HCC): ICD-10-CM

## 2019-10-03 RX ORDER — INSULIN HUMAN 100 [IU]/ML
INJECTION, SUSPENSION SUBCUTANEOUS
Qty: 1 VIAL | Refills: 5 | Status: SHIPPED | OUTPATIENT
Start: 2019-10-03

## 2020-09-16 ENCOUNTER — IMPORTED ENCOUNTER (OUTPATIENT)
Dept: URBAN - METROPOLITAN AREA CLINIC 1 | Facility: CLINIC | Age: 55
End: 2020-09-16

## 2020-09-16 PROBLEM — E11.9: Noted: 2020-09-16

## 2020-09-16 PROBLEM — H25.813: Noted: 2020-09-16

## 2020-09-16 PROBLEM — H40.023: Noted: 2020-09-16

## 2020-09-16 PROBLEM — E11.3293: Noted: 2020-09-16

## 2020-09-16 PROCEDURE — 92004 COMPRE OPH EXAM NEW PT 1/>: CPT

## 2020-09-16 PROCEDURE — 92015 DETERMINE REFRACTIVE STATE: CPT

## 2020-09-16 PROCEDURE — 92250 FUNDUS PHOTOGRAPHY W/I&R: CPT

## 2020-09-16 NOTE — PATIENT DISCUSSION
1.  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 Discussed the pathophysiology of diabetes and its effect on the eye and risk of blindness. Stressed the importance of strong glucose control. Advised of importance of at least yearly dilated examinations but to contact us immediately for any problems or concerns. 2. Glaucoma Suspect OU- (CD 0.4/0.6) IOP today 18 OU. Patient is considered high risk. Condition was discussed with patient and patient understands. Will continue to monitor patient for any progression in condition. Patient was advised to call us with any problems questions or concerns. 2.  Cataract OU- Superior subluxed lens OU. Visually significant pt denies any visual complaints or glare will observe for now without intervention. The patient was advised to contact us if any change or worsening of visionMRX for glasses given. Return for an appointment in 1 month 10/dfe/glare with Dr. Ava Swenson.

## 2020-11-16 ENCOUNTER — IMPORTED ENCOUNTER (OUTPATIENT)
Dept: URBAN - METROPOLITAN AREA CLINIC 1 | Facility: CLINIC | Age: 55
End: 2020-11-16

## 2020-11-16 PROBLEM — H40.023: Noted: 2020-11-16

## 2020-11-16 PROBLEM — H25.813: Noted: 2020-11-16

## 2020-11-16 PROBLEM — E11.9: Noted: 2020-11-16

## 2020-11-16 PROBLEM — Z79.4: Noted: 2020-11-16

## 2020-11-16 PROCEDURE — 92012 INTRM OPH EXAM EST PATIENT: CPT

## 2020-11-16 PROCEDURE — 92133 CPTRZD OPH DX IMG PST SGM ON: CPT

## 2020-11-16 NOTE — PATIENT DISCUSSION
1.  DM Type II (Insulin) without sign of diabetic retinopathy and no blot heme on dilated retinal examination today OU No Macular Edema:  Discussed the pathophysiology of diabetes and its effect on the eye and risk of blindness. Stressed the importance of strong glucose control. Advised of importance of at least yearly dilated examinations but to contact us immediately for any problems or concerns. 2. Glaucoma Suspect OU (CD 0.40/0.60) : OCT shows minimal thinning OU. Patient is considered high risk. Condition was discussed with patient and patient understands. Will continue to monitor patient for any progression in condition. Patient was advised to call us with any problems questions or concerns. 3.  Cataract OU:  Visually Significant OU discussed the risks benefits alternatives and limitations of cataract surgery. 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 Will therefore refer to Retina specialist within next couple weeks for retinal consultation with Cataract Extraction Vtx and IOL placement. Ascan performed in office today. 4. MITCHELL w/ PEK OU - Recommend ATs TID OU routinely. Return for an appointment once released from Retina with Dr. Jasmin Valdez.

## 2021-03-01 NOTE — PROGRESS NOTES
HISTORY OF PRESENT ILLNESS  Mayra Ybarra is a 50 y.o. male. HPI Comments: Patient is here as he has been having coughing with greenish - yellow Phlegm. Patient also mentions with the coughing he gets very short of breathe and also mentions that at dialysis he has noticed many people who are sick. Point of care flu testing is negative. I do hear some rhonchi and wheezing and I have advised patient to have a chest x-ray at [facility] due to the inability to do chest x-ray here. Patient mentions he would like to take the antibiotics , inhaler and cough syrup first and see how he does and  If he does not do well , he will contact the clinic for a chest x-ray. Patient mention his ride will not be able to take him to hospital.    Cold   Associated symptoms include shortness of breath. Pertinent negatives include no chest pain, no abdominal pain and no headaches. Generalized Body Aches   Associated symptoms include shortness of breath. Pertinent negatives include no chest pain, no abdominal pain and no headaches. Review of Systems   Constitutional: Positive for chills and malaise/fatigue. Negative for fever. HENT: Positive for congestion, ear pain, sinus pain and sore throat. Eyes: Negative for blurred vision, double vision, pain and discharge. Respiratory: Positive for cough and shortness of breath. Negative for wheezing. Cardiovascular: Negative for chest pain, palpitations and leg swelling. Gastrointestinal: Negative for abdominal pain, constipation, diarrhea, nausea and vomiting. Genitourinary: Negative for hematuria and urgency. Musculoskeletal: Negative for joint pain and myalgias. Neurological: Positive for weakness. Negative for dizziness, focal weakness and headaches. Psychiatric/Behavioral: Negative for depression. The patient is not nervous/anxious.       Visit Vitals    /69    Pulse 78    Temp 98.6 °F (37 °C) (Oral)    Resp 16    Ht 5' 9\" (1.753 m)    Wt 249 lb (112.9 kg)    SpO2 94%    BMI 36.77 kg/m2       Physical Exam   Constitutional: He appears well-developed and well-nourished. No distress. HENT:   Head: Normocephalic and atraumatic. Right Ear: External ear normal.   Left Ear: External ear normal.   Nose: Right sinus exhibits maxillary sinus tenderness and frontal sinus tenderness. Left sinus exhibits maxillary sinus tenderness and frontal sinus tenderness. Mouth/Throat: Posterior oropharyngeal erythema present. No oropharyngeal exudate. Eyes: EOM are normal. Pupils are equal, round, and reactive to light. No scleral icterus. Neck: Normal range of motion. No thyromegaly present. Cardiovascular: Normal rate, regular rhythm and normal heart sounds. Pulmonary/Chest: Effort normal and breath sounds normal. No respiratory distress. He has no wheezes. Abdominal: Soft. Bowel sounds are normal. He exhibits no distension. There is no tenderness. Lymphadenopathy:     He has no cervical adenopathy. Neurological: He is alert. Psychiatric: He has a normal mood and affect. ASSESSMENT and PLAN  Bronchitis:  1) Post- bronchitic cough can last for up to 4-6 weeks. 2) Please make sure you drink plenty of fluids to prevent dehydration. 3) It is ok to use cough drops , over the counter cough suppressants and expectorants as discussed in office. 4) You can use tylenol and ibuprofen for fever and body aches. 5) Please make sure you get enough rest , ok to take vitamin C 1000 mg for 1-2 weeks. 6) Avoid smoking if applicable to you and exposure to smoke. 7) Keep a humidifier around you for heat and moisture to better aid the easier coughing up of expectorant. 8) Please use inhaler as instructed. 9) Complete course of antibiotics as prescribed today,explained side effects, patient verbalized understanding.   10) Please inform the clinic if you do not feel better with treatment as you will need antibiotics N/A

## 2021-06-09 ENCOUNTER — IMPORTED ENCOUNTER (OUTPATIENT)
Dept: URBAN - METROPOLITAN AREA CLINIC 1 | Facility: CLINIC | Age: 56
End: 2021-06-09

## 2021-06-09 PROBLEM — D48.9: Noted: 2021-06-09

## 2021-06-09 PROCEDURE — 99213 OFFICE O/P EST LOW 20 MIN: CPT

## 2021-06-09 NOTE — PATIENT DISCUSSION
1.  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 Pt reports bump has gotten larger and has become bothersome. PMG saw today. 2. Glaucoma Suspect OU (CD 0.40/0.60) : IOP stable. Patient is considered high risk. Condition was discussed with patient and patient understands. Will continue to monitor patient for any progression in condition. Patient was advised to call us with any problems questions or concerns. 3.  H/o DM Type II (Insulin) without sign of diabetic retinopathy and no blot heme on dilated retinal examination today OU No Macular Edema:  Discussed the pathophysiology of diabetes and its effect on the eye and risk of blindness. Stressed the importance of strong glucose control. Advised of importance of at least yearly dilated examinations but to contact us immediately for any problems or concerns. 4. Cataract OU: Observe. 5. MITCHELL w/ PEK OU - Recommend ATs TID OU routinely. Return for an appointment For Excisional Biopsy of Hidrocystoma RUL with Dr. Danica Estevez.

## 2021-06-16 ENCOUNTER — IMPORTED ENCOUNTER (OUTPATIENT)
Dept: URBAN - METROPOLITAN AREA CLINIC 1 | Facility: CLINIC | Age: 56
End: 2021-06-16

## 2021-06-16 PROCEDURE — 67840 REMOVE EYELID LESION: CPT

## 2021-06-16 NOTE — PATIENT DISCUSSION
Excisional biopsy of mass on right upper eyelid: After proper informed consent was obtained the patient was taken to the operating room and placed supine on the operating table. The right eye was then prepped in the standard surgical fashion. Attention was then turned to the right upper eyelid where one percent Xylocaine with Epinephrine was infiltrated under the lesion. A scalpel was then used to make an elliptical incision around the lesion and Jeanna scissors were used to excise the mass free. The mass was passed off the table as a specimen and sent for pathology. Cautery was used for hemostasis and ointment was applied. The wound was small and therefore left to heal by secondary intention. The patient tolerated the procedure well and there were no complications. Return for an appointment in October for a 30/OCT with Dr. Isabel Landeros.

## 2021-06-22 PROBLEM — D48.5: Noted: 2021-06-22

## 2021-10-25 ENCOUNTER — IMPORTED ENCOUNTER (OUTPATIENT)
Dept: URBAN - METROPOLITAN AREA CLINIC 1 | Facility: CLINIC | Age: 56
End: 2021-10-25

## 2021-10-25 NOTE — PATIENT DISCUSSION
PT came in for IOL master only for Dr. Avis Jackson. Called Dr. Black's office and he said to do phakic measurments OS only. Measurements done. I gave a copy to the patient and also faxed copy to Dr. Avis Jackson. Scanned into orders.

## 2022-02-02 ENCOUNTER — IMPORTED ENCOUNTER (OUTPATIENT)
Dept: URBAN - METROPOLITAN AREA CLINIC 1 | Facility: CLINIC | Age: 57
End: 2022-02-02

## 2022-02-02 PROBLEM — H40.023: Noted: 2022-02-02

## 2022-02-02 PROBLEM — H25.813: Noted: 2022-02-02

## 2022-02-02 PROBLEM — Z79.4: Noted: 2022-02-02

## 2022-02-02 PROBLEM — Z96.1: Noted: 2022-02-02

## 2022-02-02 PROBLEM — E11.9: Noted: 2022-02-02

## 2022-02-02 PROBLEM — Z79.84: Noted: 2022-02-02

## 2022-02-02 PROCEDURE — 92015 DETERMINE REFRACTIVE STATE: CPT

## 2022-02-02 PROCEDURE — 92133 CPTRZD OPH DX IMG PST SGM ON: CPT

## 2022-02-02 PROCEDURE — 99214 OFFICE O/P EST MOD 30 MIN: CPT

## 2022-02-02 NOTE — PATIENT DISCUSSION
1.  DM Type II (Oral Meds) without sign of diabetic retinopathy and no blot heme on dilated retinal examination today OU No Macular Edema:  Discussed the pathophysiology of diabetes and its effect on the eye and risk of blindness. Stressed the importance of strong glucose control. Advised of importance of at least yearly dilated examinations but to contact us immediately for any problems or concerns. 2. Glaucoma Suspect OU -- (CD 0.40/0.60) OCT WNL OU stable. IOP 23/20. Patient is considered high risk. Condition was discussed with patient and patient understands. Will continue to monitor patient for any progression in condition. Patient was advised to call us with any problems questions or concerns. 3.  Pseudophakia OU (Dr. Quoc Romero) 4. H/o Excision Biopsy Mass RUL (PMG; 2021) MRx for glasses given to patient. Letter to PCP. Return for an appointment in 1 year 30/OCT with Dr. Tia Dodson.

## 2022-02-09 PROBLEM — R19.5 GUAIAC POSITIVE STOOLS: Status: ACTIVE | Noted: 2022-02-09

## 2022-02-09 PROBLEM — R06.02 SHORTNESS OF BREATH: Status: ACTIVE | Noted: 2022-02-09

## 2022-02-09 PROBLEM — D64.9 ANEMIA: Status: ACTIVE | Noted: 2022-02-09

## 2022-02-09 PROBLEM — E87.70 VOLUME OVERLOAD: Status: ACTIVE | Noted: 2022-02-09

## 2022-02-10 PROBLEM — J12.82 PNEUMONIA DUE TO COVID-19 VIRUS: Status: ACTIVE | Noted: 2022-01-07

## 2022-02-10 PROBLEM — U07.1 PNEUMONIA DUE TO COVID-19 VIRUS: Status: ACTIVE | Noted: 2022-01-07

## 2022-03-11 ASSESSMENT — VISUAL ACUITY
OD_GLARE: 20/400
OS_CC: 20/40-2
OS_GLARE: 20/400
OD_SC: 20/100
OD_SC: 20/150
OD_CC: 20/150
OD_SC: 20/100
OS_SC: 20/80
OS_SC: 20/30
OS_SC: 20/30-2

## 2022-03-11 ASSESSMENT — KERATOMETRY
OS_AXISANGLE_DEGREES: 042
OS_AXISANGLE2_DEGREES: 132
OD_AXISANGLE_DEGREES: 149
OS_K1POWER_DIOPTERS: 42.50
OD_K1POWER_DIOPTERS: 41.50
OD_AXISANGLE2_DEGREES: 059
OS_K2POWER_DIOPTERS: 41.25
OD_K2POWER_DIOPTERS: 40.75

## 2022-03-11 ASSESSMENT — TONOMETRY
OS_IOP_MMHG: 18
OD_IOP_MMHG: 23
OD_IOP_MMHG: 18
OS_IOP_MMHG: 18
OD_IOP_MMHG: 19
OS_IOP_MMHG: 20
OD_IOP_MMHG: 19
OS_IOP_MMHG: 18

## 2023-11-02 ENCOUNTER — FOLLOW UP (OUTPATIENT)
Dept: URBAN - METROPOLITAN AREA CLINIC 1 | Facility: CLINIC | Age: 58
End: 2023-11-02

## 2023-11-02 DIAGNOSIS — H40.023: ICD-10-CM

## 2023-11-02 DIAGNOSIS — H53.021: ICD-10-CM

## 2023-11-02 PROCEDURE — 92083 EXTENDED VISUAL FIELD XM: CPT

## 2023-11-02 PROCEDURE — 99213 OFFICE O/P EST LOW 20 MIN: CPT

## 2023-11-02 ASSESSMENT — TONOMETRY
OD_IOP_MMHG: 19
OS_IOP_MMHG: 20

## 2023-11-02 ASSESSMENT — VISUAL ACUITY
OS_CC: J1
OS_CC: 20/20-1
OD_CC: J16
OD_CC: 20/100-2

## 2024-07-01 ENCOUNTER — COMPREHENSIVE EXAM (OUTPATIENT)
Dept: URBAN - METROPOLITAN AREA CLINIC 1 | Facility: CLINIC | Age: 59
End: 2024-07-01

## 2024-07-01 DIAGNOSIS — Z79.4: ICD-10-CM

## 2024-07-01 DIAGNOSIS — H40.023: ICD-10-CM

## 2024-07-01 DIAGNOSIS — H53.021: ICD-10-CM

## 2024-07-01 DIAGNOSIS — Z96.1: ICD-10-CM

## 2024-07-01 DIAGNOSIS — E11.9: ICD-10-CM

## 2024-07-01 PROCEDURE — 92015 DETERMINE REFRACTIVE STATE: CPT

## 2024-07-01 PROCEDURE — 99214 OFFICE O/P EST MOD 30 MIN: CPT

## 2024-07-01 PROCEDURE — 92133 CPTRZD OPH DX IMG PST SGM ON: CPT

## 2024-07-01 ASSESSMENT — VISUAL ACUITY
OD_CC: J16
OS_CC: 20/20-2
OD_CC: 20/100
OS_CC: J1

## 2024-07-01 ASSESSMENT — TONOMETRY
OD_IOP_MMHG: 18
OS_IOP_MMHG: 19

## 2024-12-30 ENCOUNTER — EMERGENCY VISIT (OUTPATIENT)
Age: 59
End: 2024-12-30

## 2024-12-30 DIAGNOSIS — H20.011: ICD-10-CM

## 2024-12-30 DIAGNOSIS — H11.421: ICD-10-CM

## 2024-12-30 PROCEDURE — 92012 INTRM OPH EXAM EST PATIENT: CPT

## 2025-01-06 ENCOUNTER — FOLLOW UP (OUTPATIENT)
Age: 60
End: 2025-01-06

## 2025-01-06 DIAGNOSIS — H11.421: ICD-10-CM

## 2025-01-06 DIAGNOSIS — H20.011: ICD-10-CM

## 2025-01-06 PROCEDURE — 99213 OFFICE O/P EST LOW 20 MIN: CPT
